# Patient Record
Sex: FEMALE | Race: WHITE | NOT HISPANIC OR LATINO | Employment: FULL TIME | URBAN - METROPOLITAN AREA
[De-identification: names, ages, dates, MRNs, and addresses within clinical notes are randomized per-mention and may not be internally consistent; named-entity substitution may affect disease eponyms.]

---

## 2017-07-12 ENCOUNTER — OFFICE VISIT (OUTPATIENT)
Dept: OBSTETRICS AND GYNECOLOGY | Facility: CLINIC | Age: 27
End: 2017-07-12

## 2017-07-12 VITALS
HEIGHT: 61 IN | WEIGHT: 199 LBS | BODY MASS INDEX: 37.57 KG/M2 | DIASTOLIC BLOOD PRESSURE: 74 MMHG | SYSTOLIC BLOOD PRESSURE: 118 MMHG

## 2017-07-12 DIAGNOSIS — Z01.419 ENCOUNTER FOR GYNECOLOGICAL EXAMINATION WITHOUT ABNORMAL FINDING: Primary | ICD-10-CM

## 2017-07-12 DIAGNOSIS — Z00.00 ANNUAL PHYSICAL EXAM: ICD-10-CM

## 2017-07-12 DIAGNOSIS — Z13.9 SCREENING: ICD-10-CM

## 2017-07-12 LAB
B-HCG UR QL: NEGATIVE
BILIRUB BLD-MCNC: NEGATIVE MG/DL
CLARITY, POC: CLEAR
COLOR UR: YELLOW
GLUCOSE UR STRIP-MCNC: NEGATIVE MG/DL
INTERNAL NEGATIVE CONTROL: NEGATIVE
INTERNAL POSITIVE CONTROL: POSITIVE
KETONES UR QL: NEGATIVE
LEUKOCYTE EST, POC: NEGATIVE
Lab: NORMAL
NITRITE UR-MCNC: NEGATIVE MG/ML
PH UR: 5 [PH] (ref 5–8)
PROT UR STRIP-MCNC: NEGATIVE MG/DL
RBC # UR STRIP: NEGATIVE /UL
SP GR UR: 1.02 (ref 1–1.03)
UROBILINOGEN UR QL: NORMAL

## 2017-07-12 PROCEDURE — 81002 URINALYSIS NONAUTO W/O SCOPE: CPT | Performed by: OBSTETRICS & GYNECOLOGY

## 2017-07-12 PROCEDURE — 99395 PREV VISIT EST AGE 18-39: CPT | Performed by: OBSTETRICS & GYNECOLOGY

## 2017-07-12 PROCEDURE — 81025 URINE PREGNANCY TEST: CPT | Performed by: OBSTETRICS & GYNECOLOGY

## 2017-07-12 RX ORDER — MONTELUKAST SODIUM 10 MG/1
TABLET ORAL
Refills: 4 | COMMUNITY
Start: 2017-06-28 | End: 2018-07-18

## 2017-07-12 RX ORDER — ESCITALOPRAM OXALATE 20 MG/1
TABLET ORAL
Refills: 4 | COMMUNITY
Start: 2017-06-26 | End: 2018-07-18

## 2017-07-12 NOTE — PROGRESS NOTES
GYN Annual Exam     CC- Here for annual exam.     Denise Acosta is a 27 y.o. female who presents for annual well woman exam. Periods are regular every 28-30 days, lasting 5 days. Dysmenorrhea:none. Cyclic symptoms include none. No intermenstrual bleeding, spotting, or discharge.  Still having cycles with Mirena, also has breast tenderness.    OB History      Para Term  AB TAB SAB Ectopic Multiple Living    1                   Current contraception: IUD  History of abnormal Pap smear: no  Family history of uterine, colon or ovarian cancer: no  History of abnormal mammogram: no  Family history of breast cancer: no  Last Pap : 2016    Past Medical History:   Diagnosis Date   • GERD (gastroesophageal reflux disease)    • IBS (irritable bowel syndrome)    • Postural hypotension    • Sepsis 2014       Past Surgical History:   Procedure Laterality Date   • CHOLECYSTECTOMY           Current Outpatient Prescriptions:   •  levonorgestrel (MIRENA) 20 MCG/24HR IUD, 1 each by Intrauterine route 1 (One) Time., Disp: , Rfl:   •  escitalopram (LEXAPRO) 20 MG tablet, , Disp: , Rfl: 4  •  montelukast (SINGULAIR) 10 MG tablet, , Disp: , Rfl: 4    Allergies   Allergen Reactions   • Sulfa Antibiotics Rash       Social History   Substance Use Topics   • Smoking status: Never Smoker   • Smokeless tobacco: None   • Alcohol use No       History reviewed. No pertinent family history.    Review of Systems   Constitutional: Negative for appetite change, fever and unexpected weight change.   Respiratory: Negative for cough and shortness of breath.    Cardiovascular: Negative for chest pain and palpitations.   Gastrointestinal: Negative for abdominal distention, abdominal pain, constipation, diarrhea, nausea and vomiting.   Endocrine: Negative.    Genitourinary: Negative for dyspareunia, menstrual problem, pelvic pain and vaginal discharge.   Skin: Negative.    Hematological: Negative.    Psychiatric/Behavioral: Negative for  "dysphoric mood and sleep disturbance. The patient is not nervous/anxious.        /74  Ht 61\" (154.9 cm)  Wt 199 lb (90.3 kg)  LMP 06/19/2017  Breastfeeding? No  BMI 37.6 kg/m2    Physical Exam   Constitutional: She is oriented to person, place, and time. She appears well-developed and well-nourished.   HENT:   Head: Normocephalic and atraumatic.   Neck: Normal range of motion. Neck supple. No thyromegaly present.   Cardiovascular: Normal rate and regular rhythm.    Pulmonary/Chest: Effort normal and breath sounds normal. Right breast exhibits no mass and no nipple discharge. Left breast exhibits no mass and no nipple discharge. Breasts are symmetrical. There is no breast swelling.   Abdominal: Soft. Bowel sounds are normal. She exhibits no distension and no mass. There is no tenderness. There is no rebound and no guarding.   Genitourinary: Vagina normal and uterus normal. No breast tenderness, discharge or bleeding. Pelvic exam was performed with patient prone. There is no lesion on the right labia. There is no lesion on the left labia. Cervix exhibits no motion tenderness (string seen) and no discharge. Right adnexum displays no mass. Left adnexum displays no mass.   Musculoskeletal: Normal range of motion. She exhibits no edema.   Neurological: She is alert and oriented to person, place, and time.   Skin: Skin is warm and dry.   Psychiatric: She has a normal mood and affect. Her behavior is normal. Judgment and thought content normal.   Nursing note and vitals reviewed.      Diagnoses and all orders for this visit:    Encounter for gynecological examination without abnormal finding    Screening  -     POC Urinalysis Dipstick  -     POC Pregnancy, Urine    Annual physical exam  -     Pap IG, Rfx HPV ASCU    Other orders  -     escitalopram (LEXAPRO) 20 MG tablet;   -     montelukast (SINGULAIR) 10 MG tablet;   -     levonorgestrel (MIRENA) 20 MCG/24HR IUD; 1 each by Intrauterine route 1 (One) " Time.        Assessment     1) GYN annual well woman exam.   2) IUD check     Plan     1) Breast Health - Clinical breast exam & mammogram yearly, Self breast awareness monthly  2) Pap - done today  3) Smoking status- Never smoker  4) Colon health - screening colonoscopy recommended if not up to date  5) Bone health - Weight bearing exercise, dietary calcium recommendations and vitamin D reviewed.   6) Seat belts recommended  7) Follow up prn and one year    Encounter Diagnoses   Name Primary?   • Encounter for gynecological examination without abnormal finding Yes   • Screening    • Annual physical exam          Troy Castro MD  7/12/2017  2:35 PM

## 2017-07-14 LAB
CONV .: NORMAL
CYTOLOGIST CVX/VAG CYTO: NORMAL
CYTOLOGY CVX/VAG DOC THIN PREP: NORMAL
DX ICD CODE: NORMAL
HIV 1 & 2 AB SER-IMP: NORMAL
OTHER STN SPEC: NORMAL
PATH REPORT.FINAL DX SPEC: NORMAL
STAT OF ADQ CVX/VAG CYTO-IMP: NORMAL

## 2017-11-04 ENCOUNTER — HOSPITAL ENCOUNTER (EMERGENCY)
Facility: HOSPITAL | Age: 27
Discharge: HOME | End: 2017-11-05
Attending: EMERGENCY MEDICINE | Admitting: EMERGENCY MEDICINE

## 2017-11-04 DIAGNOSIS — M25.50 ARTHRALGIA, UNSPECIFIED JOINT: Primary | ICD-10-CM

## 2017-11-04 DIAGNOSIS — R11.2 NAUSEA VOMITING AND DIARRHEA: ICD-10-CM

## 2017-11-04 DIAGNOSIS — R19.7 NAUSEA VOMITING AND DIARRHEA: ICD-10-CM

## 2017-11-04 PROCEDURE — 99283 EMERGENCY DEPT VISIT LOW MDM: CPT

## 2017-11-05 VITALS
HEIGHT: 61 IN | RESPIRATION RATE: 16 BRPM | SYSTOLIC BLOOD PRESSURE: 120 MMHG | DIASTOLIC BLOOD PRESSURE: 75 MMHG | OXYGEN SATURATION: 99 % | HEART RATE: 79 BPM | WEIGHT: 190 LBS | TEMPERATURE: 98.3 F | BODY MASS INDEX: 35.87 KG/M2

## 2017-11-05 LAB
ALBUMIN SERPL-MCNC: 4 G/DL (ref 3.5–5.2)
ALBUMIN/GLOB SERPL: 1.2 G/DL
ALP SERPL-CCNC: 83 U/L (ref 40–129)
ALT SERPL W P-5'-P-CCNC: 21 U/L (ref 5–33)
ANION GAP SERPL CALCULATED.3IONS-SCNC: 12.5 MMOL/L
AST SERPL-CCNC: 24 U/L (ref 5–32)
B-HCG UR QL: NEGATIVE
BACTERIA UR QL AUTO: ABNORMAL /HPF
BASOPHILS # BLD AUTO: 0.03 10*3/MM3 (ref 0–0.2)
BASOPHILS NFR BLD AUTO: 0.4 % (ref 0–2)
BILIRUB SERPL-MCNC: 0.3 MG/DL (ref 0.2–1.2)
BILIRUB UR QL STRIP: NEGATIVE
BUN BLD-MCNC: 10 MG/DL (ref 6–20)
BUN/CREAT SERPL: 15.4 (ref 7–25)
CALCIUM SPEC-SCNC: 8.7 MG/DL (ref 8.6–10.5)
CHLORIDE SERPL-SCNC: 99 MMOL/L (ref 98–107)
CLARITY UR: CLEAR
CO2 SERPL-SCNC: 24.5 MMOL/L (ref 22–29)
COLOR UR: YELLOW
CREAT BLD-MCNC: 0.65 MG/DL (ref 0.57–1)
DEPRECATED RDW RBC AUTO: 40.7 FL (ref 37–54)
EOSINOPHIL # BLD AUTO: 0.02 10*3/MM3 (ref 0.1–0.3)
EOSINOPHIL NFR BLD AUTO: 0.2 % (ref 0–4)
ERYTHROCYTE [DISTWIDTH] IN BLOOD BY AUTOMATED COUNT: 13.2 % (ref 11.5–14.5)
GFR SERPL CREATININE-BSD FRML MDRD: 109 ML/MIN/1.73
GLOBULIN UR ELPH-MCNC: 3.4 GM/DL
GLUCOSE BLD-MCNC: 87 MG/DL (ref 65–99)
GLUCOSE UR STRIP-MCNC: NEGATIVE MG/DL
HCT VFR BLD AUTO: 40.5 % (ref 37–47)
HGB BLD-MCNC: 13.7 G/DL (ref 12–16)
HGB UR QL STRIP.AUTO: ABNORMAL
IMM GRANULOCYTES # BLD: 0.02 10*3/MM3 (ref 0–0.03)
IMM GRANULOCYTES NFR BLD: 0.2 % (ref 0–0.5)
KETONES UR QL STRIP: NEGATIVE
LEUKOCYTE ESTERASE UR QL STRIP.AUTO: ABNORMAL
LYMPHOCYTES # BLD AUTO: 2.04 10*3/MM3 (ref 0.6–4.8)
LYMPHOCYTES NFR BLD AUTO: 25.2 % (ref 20–45)
MCH RBC QN AUTO: 28.4 PG (ref 27–31)
MCHC RBC AUTO-ENTMCNC: 33.8 G/DL (ref 31–37)
MCV RBC AUTO: 84 FL (ref 81–99)
MONOCYTES # BLD AUTO: 0.68 10*3/MM3 (ref 0–1)
MONOCYTES NFR BLD AUTO: 8.4 % (ref 3–8)
NEUTROPHILS # BLD AUTO: 5.32 10*3/MM3 (ref 1.5–8.3)
NEUTROPHILS NFR BLD AUTO: 65.6 % (ref 45–70)
NITRITE UR QL STRIP: NEGATIVE
NRBC BLD MANUAL-RTO: 0 /100 WBC (ref 0–0)
PH UR STRIP.AUTO: 6 [PH] (ref 4.5–8)
PLATELET # BLD AUTO: 279 10*3/MM3 (ref 140–500)
PMV BLD AUTO: 10.8 FL (ref 7.4–10.4)
POTASSIUM BLD-SCNC: 3.9 MMOL/L (ref 3.5–5.2)
PROT SERPL-MCNC: 7.4 G/DL (ref 6–8.5)
PROT UR QL STRIP: NEGATIVE
RBC # BLD AUTO: 4.82 10*6/MM3 (ref 4.2–5.4)
RBC # UR: ABNORMAL /HPF
REF LAB TEST METHOD: ABNORMAL
SODIUM BLD-SCNC: 136 MMOL/L (ref 136–145)
SP GR UR STRIP: <=1.005 (ref 1–1.03)
SQUAMOUS #/AREA URNS HPF: ABNORMAL /HPF
UROBILINOGEN UR QL STRIP: ABNORMAL
WBC NRBC COR # BLD: 8.11 10*3/MM3 (ref 4.8–10.8)
WBC UR QL AUTO: ABNORMAL /HPF

## 2017-11-05 PROCEDURE — 99284 EMERGENCY DEPT VISIT MOD MDM: CPT | Performed by: EMERGENCY MEDICINE

## 2017-11-05 PROCEDURE — 80053 COMPREHEN METABOLIC PANEL: CPT | Performed by: EMERGENCY MEDICINE

## 2017-11-05 PROCEDURE — 81025 URINE PREGNANCY TEST: CPT | Performed by: EMERGENCY MEDICINE

## 2017-11-05 PROCEDURE — 81001 URINALYSIS AUTO W/SCOPE: CPT | Performed by: EMERGENCY MEDICINE

## 2017-11-05 PROCEDURE — 87086 URINE CULTURE/COLONY COUNT: CPT | Performed by: EMERGENCY MEDICINE

## 2017-11-05 PROCEDURE — 85025 COMPLETE CBC W/AUTO DIFF WBC: CPT | Performed by: EMERGENCY MEDICINE

## 2017-11-05 PROCEDURE — 96374 THER/PROPH/DIAG INJ IV PUSH: CPT

## 2017-11-05 PROCEDURE — 25010000002 ONDANSETRON PER 1 MG: Performed by: EMERGENCY MEDICINE

## 2017-11-05 RX ORDER — NABUMETONE 750 MG/1
750 TABLET, FILM COATED ORAL 2 TIMES DAILY PRN
Qty: 14 TABLET | Refills: 0 | Status: SHIPPED | OUTPATIENT
Start: 2017-11-05 | End: 2017-11-12

## 2017-11-05 RX ORDER — ONDANSETRON 2 MG/ML
4 INJECTION INTRAMUSCULAR; INTRAVENOUS ONCE
Status: COMPLETED | OUTPATIENT
Start: 2017-11-05 | End: 2017-11-05

## 2017-11-05 RX ORDER — ONDANSETRON 4 MG/1
4 TABLET, ORALLY DISINTEGRATING ORAL EVERY 6 HOURS PRN
Qty: 20 TABLET | Refills: 0 | Status: SHIPPED | OUTPATIENT
Start: 2017-11-05 | End: 2018-07-18

## 2017-11-05 RX ORDER — DIPHENOXYLATE HYDROCHLORIDE AND ATROPINE SULFATE 2.5; .025 MG/1; MG/1
1 TABLET ORAL 4 TIMES DAILY PRN
Qty: 10 TABLET | Refills: 0 | Status: SHIPPED | OUTPATIENT
Start: 2017-11-05 | End: 2018-07-18

## 2017-11-05 RX ADMIN — ONDANSETRON 4 MG: 2 INJECTION, SOLUTION INTRAMUSCULAR; INTRAVENOUS at 01:38

## 2017-11-05 NOTE — DISCHARGE INSTRUCTIONS
Medications directed.  Follow-up with Dr. Easton as above.  Can prescribing a strong NSAID.  Watch for signs of GI bleeding.  Return to ED for medical emergencies.

## 2017-11-05 NOTE — ED PROVIDER NOTES
Subjective   Patient is a 27 y.o. female presenting with vomiting.   History provided by:  Patient  Vomiting   The primary symptoms include nausea, vomiting, diarrhea, myalgias and arthralgias. Primary symptoms do not include fever or rash.   The vomiting began today. Vomiting occurred once.   The diarrhea began today. Daily occurrences: 3 times.   Significant associated medical issues include GERD and irritable bowel syndrome.     HPI Narrative:Ms. Acosta is a 27-year-old white female who presents with a two-week history of body aches and migratory joint pain.  Patient reports initially the symptoms were only occurring at night.  However the past 2 days the symptoms have occurred during the day as well as at night.  Tonight patient had an episode where she felt very hot.  This was followed by nausea and one episode of vomiting.  Patient has had 3 episodes of diarrhea today.  Patient denies any recent illness or injury.  Patient presents for evaluation.        Review of Systems   Constitutional: Negative.  Negative for appetite change, diaphoresis and fever.   HENT: Negative.    Eyes: Negative.    Respiratory: Negative for cough, chest tightness, shortness of breath and wheezing.    Cardiovascular: Negative for chest pain, palpitations and leg swelling.   Gastrointestinal: Positive for diarrhea, nausea and vomiting.   Genitourinary: Negative.  Negative for difficulty urinating, flank pain, frequency and hematuria.   Musculoskeletal: Positive for arthralgias and myalgias.   Skin: Negative.  Negative for color change, pallor and rash.   Neurological: Negative.  Negative for dizziness, seizures, syncope and headaches.   Psychiatric/Behavioral: Negative.  Negative for agitation, behavioral problems and hallucinations.       Past Medical History:   Diagnosis Date   • GERD (gastroesophageal reflux disease)    • IBS (irritable bowel syndrome)    • Postural hypotension    • Sepsis 04/2014       Allergies   Allergen  Reactions   • Sulfa Antibiotics Rash       Past Surgical History:   Procedure Laterality Date   • CHOLECYSTECTOMY         History reviewed. No pertinent family history.    Social History     Social History   • Marital status:      Spouse name: N/A   • Number of children: N/A   • Years of education: N/A     Social History Main Topics   • Smoking status: Never Smoker   • Smokeless tobacco: None   • Alcohol use No   • Drug use: No   • Sexual activity: Not Asked     Other Topics Concern   • None     Social History Narrative           Objective   Physical Exam   Constitutional: She is oriented to person, place, and time. She appears well-developed and well-nourished. No distress.   27-year-old white female laying in bed.  She is a bit overweight but otherwise appears in good health.  She is accompanied by her spouse.   HENT:   Head: Normocephalic and atraumatic.   Right Ear: External ear normal.   Left Ear: External ear normal.   Nose: Nose normal.   Mouth/Throat: Oropharynx is clear and moist.   Eyes: Conjunctivae and EOM are normal. Pupils are equal, round, and reactive to light.   Neck: Normal range of motion. Neck supple.   Cardiovascular: Normal rate, regular rhythm, normal heart sounds and intact distal pulses.  Exam reveals no gallop and no friction rub.    No murmur heard.  Pulmonary/Chest: Effort normal and breath sounds normal.   Abdominal: Soft. Bowel sounds are normal. She exhibits no distension. There is no tenderness.   Musculoskeletal: Normal range of motion.   Neurological: She is alert and oriented to person, place, and time.   Skin: Skin is warm and dry. She is not diaphoretic.   Psychiatric: She has a normal mood and affect. Her behavior is normal.   Nursing note and vitals reviewed.      Procedures         ED Course  ED Course   Comment By Time   11/04/17  11:55 PM  Will obtain baseline labs.  Patient does not request medication for pain, nausea or vomiting or diarrhea at this time. Juan Manuel CHE  MD Dulce Maria 11/04 2356   11/05/17  1:27 AM  CBC and CMP are unremarkable.  Urine is contaminated.  Patient has no symptoms of dysuria.  I find no etiology for her laboratory joint pain.  Will DC home. Juan Manuel العراقي MD 11/05 0128   11/05/17  1:33 AM  Patient is feeling hot and nauseated again.  Will give Zofran. Juan Manuel العراقي MD 11/05 0133   11/05/17  1:56 AM  Patient feeling better.  Will DC home.  Patient mentioned that she has an aunt with lupus.  While patient's symptoms are not consistent with lupus. I reviewed this information has a further indication that patient should follow-up with her PMD for additional testing and evaluation. Juan Manuel العراقي MD 11/05 0157      Labs Reviewed   URINALYSIS W/ CULTURE IF INDICATED - Abnormal; Notable for the following:        Result Value    Blood, UA Small (1+) (*)     Leuk Esterase, UA Small (1+) (*)     All other components within normal limits   CBC WITH AUTO DIFFERENTIAL - Abnormal; Notable for the following:     MPV 10.8 (*)     Monocyte % 8.4 (*)     Eosinophils, Absolute 0.02 (*)     All other components within normal limits   URINALYSIS, MICROSCOPIC ONLY - Abnormal; Notable for the following:     RBC, UA 6-12 (*)     WBC, UA 6-12 (*)     Bacteria, UA Trace (*)     Squamous Epithelial Cells, UA 7-12 (*)     All other components within normal limits   PREGNANCY, URINE - Normal   URINE CULTURE   COMPREHENSIVE METABOLIC PANEL   CBC AND DIFFERENTIAL    Narrative:     The following orders were created for panel order CBC & Differential.  Procedure                               Abnormality         Status                     ---------                               -----------         ------                     CBC Auto Differential[[id number]]        Abnormal            Final result                 Please view results for these tests on the individual orders.               MDM  Number of Diagnoses or Management Options  Arthralgia, unspecified joint: new and requires  workup  Nausea vomiting and diarrhea: new and requires workup     Amount and/or Complexity of Data Reviewed  Clinical lab tests: reviewed and ordered    Risk of Complications, Morbidity, and/or Mortality  Presenting problems: low  Diagnostic procedures: moderate  Management options: low    Patient Progress  Patient progress: stable      Final diagnoses:   Arthralgia, unspecified joint   Nausea vomiting and diarrhea            Juan Manuel العراقي MD  11/05/17 5201

## 2017-11-06 LAB
BACTERIA SPEC AEROBE CULT: NORMAL
BACTERIA SPEC AEROBE CULT: NORMAL

## 2017-11-08 ENCOUNTER — OFFICE VISIT (OUTPATIENT)
Dept: RETAIL CLINIC | Facility: CLINIC | Age: 27
End: 2017-11-08

## 2017-11-08 VITALS
DIASTOLIC BLOOD PRESSURE: 76 MMHG | RESPIRATION RATE: 17 BRPM | OXYGEN SATURATION: 99 % | SYSTOLIC BLOOD PRESSURE: 116 MMHG | TEMPERATURE: 99.6 F | HEART RATE: 103 BPM

## 2017-11-08 DIAGNOSIS — J02.9 SORE THROAT: ICD-10-CM

## 2017-11-08 DIAGNOSIS — H69.83 DYSFUNCTION OF BOTH EUSTACHIAN TUBES: Primary | ICD-10-CM

## 2017-11-08 LAB
EXPIRATION DATE: NORMAL
INTERNAL CONTROL: NORMAL
Lab: NORMAL
S PYO AG THROAT QL: NEGATIVE

## 2017-11-08 PROCEDURE — 87880 STREP A ASSAY W/OPTIC: CPT | Performed by: NURSE PRACTITIONER

## 2017-11-08 PROCEDURE — 99213 OFFICE O/P EST LOW 20 MIN: CPT | Performed by: NURSE PRACTITIONER

## 2017-11-08 NOTE — PROGRESS NOTES
Subjective   Denise Acosta is a 27 y.o. female.     HPI Comments: Pt reports with a sore throat. Reports her daughter has had a sore throat and was seen by her PCP. Reports she was not strep tested. No fever.     Sore Throat    This is a new problem. The current episode started yesterday. The problem has been unchanged. There has been no fever. Associated symptoms include ear pain, headaches and vomiting. Pertinent negatives include no ear discharge. She has had no exposure to strep. She has tried nothing for the symptoms. The treatment provided no relief.        The following portions of the patient's history were reviewed and updated as appropriate: allergies, current medications, past family history, past medical history, past social history, past surgical history and problem list.    Review of Systems   Constitutional: Negative.  Negative for chills and fever.   HENT: Positive for ear pain and sore throat. Negative for ear discharge.         Ears popping   Eyes: Negative.    Respiratory: Negative.    Cardiovascular: Negative.    Gastrointestinal: Positive for vomiting.        Diarrhea and vomited x 2 last week   Endocrine: Negative.    Genitourinary: Negative.    Musculoskeletal: Negative.    Skin: Negative.    Allergic/Immunologic: Negative.    Neurological: Positive for headaches.   Hematological: Negative.    Psychiatric/Behavioral: Negative.        Objective   Physical Exam   Constitutional: She is oriented to person, place, and time. Vital signs are normal. She appears well-developed and well-nourished.   HENT:   Head: Normocephalic and atraumatic.   Right Ear: Hearing, tympanic membrane, external ear and ear canal normal.   Left Ear: Hearing, tympanic membrane, external ear and ear canal normal.   Nose: Nose normal. Right sinus exhibits no maxillary sinus tenderness and no frontal sinus tenderness. Left sinus exhibits no maxillary sinus tenderness and no frontal sinus tenderness.   Mouth/Throat: Uvula  is midline and mucous membranes are normal. Posterior oropharyngeal erythema present. No tonsillar exudate.   Eyes: Conjunctivae and lids are normal. Pupils are equal, round, and reactive to light.   Neck: Trachea normal and normal range of motion. Neck supple.   Cardiovascular: Normal rate, regular rhythm, S1 normal, S2 normal and normal heart sounds.    Pulmonary/Chest: Effort normal and breath sounds normal.   Abdominal: Soft. Normal appearance and bowel sounds are normal. There is no tenderness.   Musculoskeletal: Normal range of motion.   Lymphadenopathy:     She has no cervical adenopathy.   Neurological: She is alert and oriented to person, place, and time. She has normal strength.   Skin: Skin is warm, dry and intact. No rash noted.   Psychiatric: She has a normal mood and affect. Her speech is normal and behavior is normal.   Vitals reviewed.      Assessment/Plan   Problems Addressed this Visit     None      Visit Diagnoses     Dysfunction of both eustachian tubes    -  Primary    Sore throat        Relevant Orders    POC Rapid Strep A    Beta Strep Culture, Throat - Swab, Throat        flonase 2 sprays qd

## 2017-11-10 LAB — S PYO THROAT QL CULT: NEGATIVE

## 2017-11-11 ENCOUNTER — TELEPHONE (OUTPATIENT)
Dept: RETAIL CLINIC | Facility: CLINIC | Age: 27
End: 2017-11-11

## 2017-11-12 ENCOUNTER — TELEPHONE (OUTPATIENT)
Dept: RETAIL CLINIC | Facility: CLINIC | Age: 27
End: 2017-11-12

## 2018-07-18 ENCOUNTER — OFFICE VISIT (OUTPATIENT)
Dept: OBSTETRICS AND GYNECOLOGY | Facility: CLINIC | Age: 28
End: 2018-07-18

## 2018-07-18 VITALS
DIASTOLIC BLOOD PRESSURE: 80 MMHG | WEIGHT: 204 LBS | BODY MASS INDEX: 38.51 KG/M2 | HEIGHT: 61 IN | SYSTOLIC BLOOD PRESSURE: 120 MMHG

## 2018-07-18 DIAGNOSIS — N93.8 DUB (DYSFUNCTIONAL UTERINE BLEEDING): Primary | ICD-10-CM

## 2018-07-18 DIAGNOSIS — Z30.432 ENCOUNTER FOR REMOVAL OF INTRAUTERINE CONTRACEPTIVE DEVICE (IUD): ICD-10-CM

## 2018-07-18 LAB
B-HCG UR QL: NEGATIVE
BILIRUB BLD-MCNC: NEGATIVE MG/DL
CLARITY, POC: CLEAR
COLOR UR: YELLOW
GLUCOSE UR STRIP-MCNC: NEGATIVE MG/DL
INTERNAL NEGATIVE CONTROL: NEGATIVE
INTERNAL POSITIVE CONTROL: POSITIVE
KETONES UR QL: NEGATIVE
LEUKOCYTE EST, POC: NEGATIVE
Lab: NORMAL
NITRITE UR-MCNC: NEGATIVE MG/ML
PH UR: 6 [PH] (ref 5–8)
PROT UR STRIP-MCNC: NEGATIVE MG/DL
RBC # UR STRIP: NEGATIVE /UL
SP GR UR: 1.02 (ref 1–1.03)
UROBILINOGEN UR QL: NORMAL

## 2018-07-18 PROCEDURE — 81002 URINALYSIS NONAUTO W/O SCOPE: CPT | Performed by: OBSTETRICS & GYNECOLOGY

## 2018-07-18 PROCEDURE — 81025 URINE PREGNANCY TEST: CPT | Performed by: OBSTETRICS & GYNECOLOGY

## 2018-07-18 PROCEDURE — 58301 REMOVE INTRAUTERINE DEVICE: CPT | Performed by: OBSTETRICS & GYNECOLOGY

## 2018-07-18 RX ORDER — FLUTICASONE PROPIONATE 50 MCG
2 SPRAY, SUSPENSION (ML) NASAL DAILY
COMMUNITY
End: 2019-08-28

## 2018-07-18 RX ORDER — CITALOPRAM 40 MG/1
40 TABLET ORAL DAILY
COMMUNITY
End: 2019-08-28

## 2018-07-18 RX ORDER — ATORVASTATIN CALCIUM 10 MG/1
10 TABLET, FILM COATED ORAL DAILY
COMMUNITY
End: 2019-08-28

## 2018-07-18 NOTE — PROGRESS NOTES
This is a 28-year-old  who has had a Mirena for the last 2 years.  She's had dysfunctional uterine bleeding for the last 10 months and would rather not use anything and have regular cycles then continue with the Mirena.  She declines any birth control at this time because of side effects from everything she is tried in the past.  She is okay with a future pregnancy.      IUD Removal Procedure Note    Type of IUD:  Mirena  Date of insertion:  2 years ago  Reason for removal:  Side effect: DUB  Other relevant history/information:  none    Procedure Time Out Documentation      Procedure Details  IUD strings visible:  no  Local anesthesia:  None  Tenaculum used:  None  Removal:  An alligator forceps was entered through the cervical os after the cervix and vagina were prepped.  The IUD was grasped and removed intact.  1 attempt(s) were needed for successful removal.  The patient tolerated the procedure well.    All appropriate instructions regarding removal were reviewed.    Tolerated well  No apparent complications  Post procedure diagnosis : IUD removal     Plans for contraception:  no method    Other follow-up needed:  none    The patient was advised to call for any fever or for prolonged or severe pain or bleeding. She was advised to use NSAID as needed for mild to moderate pain.   Patient tolerated the procedure well.    2018  3:06 PM

## 2019-03-13 ENCOUNTER — TRANSCRIBE ORDERS (OUTPATIENT)
Dept: ADMINISTRATIVE | Facility: HOSPITAL | Age: 29
End: 2019-03-13

## 2019-03-13 DIAGNOSIS — N92.0 MENORRHAGIA WITH REGULAR CYCLE: Primary | ICD-10-CM

## 2019-03-19 ENCOUNTER — HOSPITAL ENCOUNTER (OUTPATIENT)
Dept: ULTRASOUND IMAGING | Facility: HOSPITAL | Age: 29
Discharge: HOME | End: 2019-03-19
Admitting: PHYSICIAN ASSISTANT

## 2019-03-19 DIAGNOSIS — N92.0 MENORRHAGIA WITH REGULAR CYCLE: ICD-10-CM

## 2019-03-19 PROCEDURE — 76830 TRANSVAGINAL US NON-OB: CPT

## 2019-03-19 PROCEDURE — 76856 US EXAM PELVIC COMPLETE: CPT

## 2019-03-22 ENCOUNTER — PATIENT MESSAGE (OUTPATIENT)
Dept: OBSTETRICS AND GYNECOLOGY | Facility: CLINIC | Age: 29
End: 2019-03-22

## 2019-08-28 ENCOUNTER — OFFICE VISIT (OUTPATIENT)
Dept: OBSTETRICS AND GYNECOLOGY | Facility: CLINIC | Age: 29
End: 2019-08-28

## 2019-08-28 VITALS
DIASTOLIC BLOOD PRESSURE: 76 MMHG | BODY MASS INDEX: 38.14 KG/M2 | HEIGHT: 61 IN | WEIGHT: 202 LBS | SYSTOLIC BLOOD PRESSURE: 122 MMHG

## 2019-08-28 DIAGNOSIS — Z13.9 SCREENING FOR CONDITION: ICD-10-CM

## 2019-08-28 DIAGNOSIS — Z11.51 SPECIAL SCREENING EXAMINATION FOR HUMAN PAPILLOMAVIRUS (HPV): ICD-10-CM

## 2019-08-28 DIAGNOSIS — Z01.419 PAP SMEAR, LOW-RISK: ICD-10-CM

## 2019-08-28 DIAGNOSIS — Z01.419 ENCOUNTER FOR GYNECOLOGICAL EXAMINATION WITHOUT ABNORMAL FINDING: Primary | ICD-10-CM

## 2019-08-28 PROCEDURE — 81025 URINE PREGNANCY TEST: CPT | Performed by: OBSTETRICS & GYNECOLOGY

## 2019-08-28 PROCEDURE — 81002 URINALYSIS NONAUTO W/O SCOPE: CPT | Performed by: OBSTETRICS & GYNECOLOGY

## 2019-08-28 PROCEDURE — 99395 PREV VISIT EST AGE 18-39: CPT | Performed by: OBSTETRICS & GYNECOLOGY

## 2019-08-28 RX ORDER — HYOSCYAMINE SULFATE 0.12 MG/5ML
125 LIQUID ORAL EVERY 4 HOURS PRN
COMMUNITY
End: 2021-06-01

## 2019-08-28 RX ORDER — ATORVASTATIN CALCIUM 80 MG/1
TABLET, FILM COATED ORAL
COMMUNITY
Start: 2019-02-08 | End: 2023-01-10 | Stop reason: SDUPTHER

## 2019-08-28 NOTE — PROGRESS NOTES
GYN Annual Exam     CC- Here for annual exam.     Denise Acosta is a 29 y.o. female who presents for annual well woman exam. Periods are irregular, lasting 6 days. Dysmenorrhea:none. Cyclic symptoms include none. No intermenstrual bleeding, spotting, or discharge.    OB History      Para Term  AB Living    1              SAB TAB Ectopic Molar Multiple Live Births                         Current contraception: none  History of abnormal Pap smear: no  Family history of uterine, colon or ovarian cancer: no  History of abnormal mammogram: no  Family history of breast cancer: no  Last Pap : 2016    Past Medical History:   Diagnosis Date   • GERD (gastroesophageal reflux disease)    • IBS (irritable bowel syndrome)    • Postural hypotension    • Sepsis (CMS/HCC) 2014       Past Surgical History:   Procedure Laterality Date   • CHOLECYSTECTOMY     • WISDOM TOOTH EXTRACTION           Current Outpatient Medications:   •  atorvastatin (LIPITOR) 80 MG tablet, , Disp: , Rfl:   •  hyoscyamine (LEVSIN) 0.125 MG/5ML elixir, Take 125 mcg by mouth Every 4 (Four) Hours As Needed for bladder spasms., Disp: , Rfl:     Allergies   Allergen Reactions   • Sulfa Antibiotics Rash       Social History     Tobacco Use   • Smoking status: Never Smoker   • Smokeless tobacco: Never Used   Substance Use Topics   • Alcohol use: No   • Drug use: No       Family History   Problem Relation Age of Onset   • Multiple myeloma Maternal Grandmother    • Heart disease Maternal Grandfather    • Diabetes Paternal Grandfather        Review of Systems   Constitutional: Negative for appetite change, fever and unexpected weight change.   HENT: Negative for congestion and sore throat.    Respiratory: Negative for cough and shortness of breath.    Cardiovascular: Negative for chest pain and palpitations.   Gastrointestinal: Negative for abdominal distention, abdominal pain, constipation, diarrhea, nausea and vomiting.   Endocrine: Negative.   "  Genitourinary: Positive for menstrual problem (irregular cycle). Negative for dyspareunia, pelvic pain and vaginal discharge.   Skin: Negative.    Neurological: Negative for dizziness and syncope.   Hematological: Negative.    Psychiatric/Behavioral: Negative for dysphoric mood and sleep disturbance. The patient is not nervous/anxious.        /76   Ht 154.9 cm (61\")   Wt 91.6 kg (202 lb)   LMP 08/14/2019   BMI 38.17 kg/m²     Physical Exam   Constitutional: She is oriented to person, place, and time. She appears well-developed and well-nourished.   HENT:   Head: Normocephalic and atraumatic.   Neck: Normal range of motion. Neck supple. No thyromegaly present.   Cardiovascular: Normal rate and regular rhythm.   Pulmonary/Chest: Effort normal and breath sounds normal. Right breast exhibits no mass and no nipple discharge. Left breast exhibits no mass and no nipple discharge. Breasts are symmetrical. There is no breast swelling.   Abdominal: Soft. Bowel sounds are normal. She exhibits no distension and no mass. There is no tenderness. There is no rebound and no guarding.   Genitourinary: Vagina normal and uterus normal. No breast tenderness, discharge or bleeding. Pelvic exam was performed with patient prone. There is no lesion on the right labia. There is no lesion on the left labia. Cervix exhibits no motion tenderness and no discharge. Right adnexum displays no mass. Left adnexum displays no mass.   Musculoskeletal: Normal range of motion. She exhibits no edema.   Neurological: She is alert and oriented to person, place, and time.   Skin: Skin is warm and dry.   Psychiatric: She has a normal mood and affect. Her behavior is normal. Judgment and thought content normal.   Nursing note and vitals reviewed.      Diagnoses and all orders for this visit:    Encounter for gynecological examination without abnormal finding    Screening for condition  -     POC Urinalysis Dipstick  -     POC Pregnancy, " Urine    Special screening examination for human papillomavirus (HPV)  -     Pap IG, HPV-hr    Pap smear, low-risk  -     Pap IG, HPV-hr    Other orders  -     atorvastatin (LIPITOR) 80 MG tablet  -     hyoscyamine (LEVSIN) 0.125 MG/5ML elixir; Take 125 mcg by mouth Every 4 (Four) Hours As Needed for bladder spasms.        Assessment     1) GYN annual well woman exam.   2) Irregular cycle -declines birth control right now.  Does desire pregnancy.  Is worried about history of  birth.  Discussed options of Guy in the future.     Plan     1) Breast Health - Clinical breast exam & mammogram yearly, Self breast awareness monthly  2) Pap - done today  3) Smoking status- Never smoker  4) Colon health - screening colonoscopy recommended if not up to date  5) Bone health - Weight bearing exercise, dietary calcium recommendations and vitamin D reviewed.   6) Seat belts recommended  7) Follow up prn and one year    Encounter Diagnoses   Name Primary?   • Encounter for gynecological examination without abnormal finding Yes   • Screening for condition    • Special screening examination for human papillomavirus (HPV)    • Pap smear, low-risk          Troy Castro MD  2019  2:53 PM

## 2019-08-30 LAB
CYTOLOGIST CVX/VAG CYTO: NORMAL
CYTOLOGY CVX/VAG DOC CYTO: NORMAL
CYTOLOGY CVX/VAG DOC THIN PREP: NORMAL
DX ICD CODE: NORMAL
HIV 1 & 2 AB SER-IMP: NORMAL
HPV I/H RISK 1 DNA CVX QL PROBE+SIG AMP: NORMAL
OTHER STN SPEC: NORMAL
REQUEST PROBLEM: NORMAL
STAT OF ADQ CVX/VAG CYTO-IMP: NORMAL

## 2020-04-13 ENCOUNTER — TELEPHONE (OUTPATIENT)
Dept: OBSTETRICS AND GYNECOLOGY | Facility: CLINIC | Age: 30
End: 2020-04-13

## 2020-07-15 ENCOUNTER — OFFICE VISIT (OUTPATIENT)
Dept: OBSTETRICS AND GYNECOLOGY | Facility: CLINIC | Age: 30
End: 2020-07-15

## 2020-07-15 VITALS
WEIGHT: 197 LBS | DIASTOLIC BLOOD PRESSURE: 78 MMHG | SYSTOLIC BLOOD PRESSURE: 122 MMHG | HEIGHT: 61 IN | BODY MASS INDEX: 37.19 KG/M2

## 2020-07-15 DIAGNOSIS — R89.8 ABNORMAL GENETIC TEST: ICD-10-CM

## 2020-07-15 DIAGNOSIS — Z13.9 SCREENING FOR CONDITION: ICD-10-CM

## 2020-07-15 DIAGNOSIS — R10.2 PELVIC PAIN: Primary | ICD-10-CM

## 2020-07-15 LAB
B-HCG UR QL: NEGATIVE
BILIRUB BLD-MCNC: NEGATIVE MG/DL
CLARITY, POC: CLEAR
COLOR UR: YELLOW
GLUCOSE UR STRIP-MCNC: NEGATIVE MG/DL
INTERNAL NEGATIVE CONTROL: NEGATIVE
INTERNAL POSITIVE CONTROL: POSITIVE
KETONES UR QL: NEGATIVE
LEUKOCYTE EST, POC: NEGATIVE
Lab: NORMAL
NITRITE UR-MCNC: NEGATIVE MG/ML
PH UR: 5 [PH] (ref 5–8)
PROT UR STRIP-MCNC: NEGATIVE MG/DL
RBC # UR STRIP: NEGATIVE /UL
SP GR UR: 1.03 (ref 1–1.03)
UROBILINOGEN UR QL: NORMAL

## 2020-07-15 PROCEDURE — 81025 URINE PREGNANCY TEST: CPT | Performed by: OBSTETRICS & GYNECOLOGY

## 2020-07-15 PROCEDURE — 99214 OFFICE O/P EST MOD 30 MIN: CPT | Performed by: OBSTETRICS & GYNECOLOGY

## 2020-07-15 PROCEDURE — 81002 URINALYSIS NONAUTO W/O SCOPE: CPT | Performed by: OBSTETRICS & GYNECOLOGY

## 2020-07-15 RX ORDER — BUPROPION HYDROCHLORIDE 150 MG/1
TABLET, EXTENDED RELEASE ORAL
COMMUNITY
Start: 2020-06-11 | End: 2021-06-01

## 2020-07-15 NOTE — PROGRESS NOTES
"      Denise Acosta is a 30 y.o. patient who presents for follow up of   Chief Complaint   Patient presents with   • Pelvic Pain       HPI 30-year-old -1-0-1 with pelvic pain since 2020.  She developed the pain is her primary care physician but then COVID-19 hit and she has not followed up since that time.  She said the pain occurs daily and it is sharp.  Is mainly in the midline and does not radiate.  She denies any vaginal discharge, fever.  Manito is painful.  She has no family history of endometriosis.  She was notified from a commercial genetic lab that she has an increased risk of ovarian cancer and sent me a copy of the scanned forms in epic.  She is using vasectomy for birth control.  This is a new problem that is worsening.    The following portions of the patient's history were reviewed and updated as appropriate: allergies, current medications and problem list.    Review of Systems   Constitutional: Negative for appetite change, fever and unexpected weight change.   HENT: Negative for congestion and sore throat.    Respiratory: Negative for cough and shortness of breath.    Cardiovascular: Negative for chest pain and palpitations.   Gastrointestinal: Negative for abdominal distention, abdominal pain, constipation, diarrhea, nausea and vomiting.   Endocrine: Negative.    Genitourinary: Positive for dyspareunia and pelvic pain. Negative for menstrual problem and vaginal discharge.   Skin: Negative.    Neurological: Negative for dizziness and syncope.   Hematological: Negative.    Psychiatric/Behavioral: Negative for dysphoric mood and sleep disturbance. The patient is not nervous/anxious.        /78   Ht 154.9 cm (61\")   Wt 89.4 kg (197 lb)   LMP 2020   BMI 37.22 kg/m²     Physical Exam   Constitutional: She is oriented to person, place, and time. She appears well-developed and well-nourished.   HENT:   Head: Normocephalic and atraumatic.   Pulmonary/Chest: Effort normal. " No respiratory distress.   Abdominal: Soft. She exhibits no distension and no mass. There is no tenderness. There is no rebound and no guarding.   Musculoskeletal: Normal range of motion.   Neurological: She is alert and oriented to person, place, and time.   Skin: Skin is warm and dry.   Psychiatric: She has a normal mood and affect. Her behavior is normal. Judgment and thought content normal.   Nursing note and vitals reviewed.  I reviewed the scanned images of her genetic report in epic.  There appears to be a significant increased risk for ovarian cancer that might justify prophylactic BSO in the future.      Assessment/Plan    Denise was seen today for pelvic pain.    Diagnoses and all orders for this visit:    Pelvic pain    Screening for condition  -     POC Urinalysis Dipstick  -     POC Pregnancy, Urine    This is a new problem that is worsening with further work-up planned.  Ultrasound of the pelvis has been ordered.  I would like for the patient to meet with a genetic counselor.  I suspect we may need to do a diagnostic laparoscopy to rule out endometriosis.  She may need a prophylactic BSO at the same time.     Return for US, TV, Follow up with me.      Troy Castro MD  7/15/2020  13:23

## 2020-08-25 ENCOUNTER — PROCEDURE VISIT (OUTPATIENT)
Dept: OBSTETRICS AND GYNECOLOGY | Facility: CLINIC | Age: 30
End: 2020-08-25

## 2020-08-25 ENCOUNTER — OFFICE VISIT (OUTPATIENT)
Dept: OBSTETRICS AND GYNECOLOGY | Facility: CLINIC | Age: 30
End: 2020-08-25

## 2020-08-25 VITALS
BODY MASS INDEX: 37.57 KG/M2 | SYSTOLIC BLOOD PRESSURE: 110 MMHG | WEIGHT: 199 LBS | DIASTOLIC BLOOD PRESSURE: 70 MMHG | HEIGHT: 61 IN

## 2020-08-25 DIAGNOSIS — R10.2 PELVIC PAIN: Primary | ICD-10-CM

## 2020-08-25 PROCEDURE — 99213 OFFICE O/P EST LOW 20 MIN: CPT | Performed by: OBSTETRICS & GYNECOLOGY

## 2020-08-25 PROCEDURE — 76830 TRANSVAGINAL US NON-OB: CPT | Performed by: OBSTETRICS & GYNECOLOGY

## 2020-08-25 NOTE — PROGRESS NOTES
"      Denise Acosta is a 30 y.o. patient who presents for follow up of   Chief Complaint   Patient presents with   • Follow-up       HPI patient here for follow-up.  She has had intermittent pelvic pain that tends to radiate to her back.  It is been occurring more frequently over the last 6 months.  She presents today for an ultrasound.  She is using vasectomy for birth control.  Her cycles are regular.    The following portions of the patient's history were reviewed and updated as appropriate: allergies, current medications and problem list.    Review of Systems   Constitutional: Negative for appetite change, fever and unexpected weight change.   HENT: Negative for congestion and sore throat.    Respiratory: Negative for cough and shortness of breath.    Cardiovascular: Negative for chest pain and palpitations.   Gastrointestinal: Negative for abdominal distention, abdominal pain, constipation, diarrhea, nausea and vomiting.   Endocrine: Negative.    Genitourinary: Positive for pelvic pain. Negative for dyspareunia, menstrual problem and vaginal discharge.   Skin: Negative.    Neurological: Negative for dizziness and syncope.   Hematological: Negative.    Psychiatric/Behavioral: Negative for dysphoric mood and sleep disturbance. The patient is not nervous/anxious.        /70   Ht 154.9 cm (61\")   Wt 90.3 kg (199 lb)   Breastfeeding No   BMI 37.60 kg/m²     Physical Exam   Constitutional: She is oriented to person, place, and time. She appears well-developed and well-nourished.   HENT:   Head: Normocephalic and atraumatic.   Pulmonary/Chest: Effort normal. No respiratory distress.   Abdominal: Soft. She exhibits no distension and no mass. There is no tenderness. There is no rebound and no guarding.   Musculoskeletal: Normal range of motion.   Neurological: She is alert and oriented to person, place, and time.   Skin: Skin is warm and dry.   Psychiatric: She has a normal mood and affect. Her behavior is " normal. Judgment and thought content normal.   Nursing note and vitals reviewed.  Ultrasound shows an anteverted uterus.  Normal size shape and contour.  Endometrial thickness was normal.  Both ovaries appeared normal.  There is a small amount of free fluid in the right adnexa.      Assessment/Plan    Denise was seen today for follow-up.    Diagnoses and all orders for this visit:    Pelvic pain    Likely ovulatory pain.  No pathology that needs an operation at this time.  Patient was reassured.  She has annual physical next month.    Return if symptoms worsen or fail to improve.      Troy Castro MD  8/25/2020  15:43

## 2020-09-01 ENCOUNTER — OFFICE VISIT (OUTPATIENT)
Dept: OBSTETRICS AND GYNECOLOGY | Facility: CLINIC | Age: 30
End: 2020-09-01

## 2020-09-01 VITALS
WEIGHT: 198 LBS | DIASTOLIC BLOOD PRESSURE: 82 MMHG | HEIGHT: 61 IN | SYSTOLIC BLOOD PRESSURE: 122 MMHG | BODY MASS INDEX: 37.38 KG/M2

## 2020-09-01 DIAGNOSIS — Z01.419 ROUTINE GYNECOLOGICAL EXAMINATION: Primary | ICD-10-CM

## 2020-09-01 DIAGNOSIS — Z11.51 SPECIAL SCREENING EXAMINATION FOR HUMAN PAPILLOMAVIRUS (HPV): ICD-10-CM

## 2020-09-01 DIAGNOSIS — Z01.419 PAP SMEAR, LOW-RISK: ICD-10-CM

## 2020-09-01 LAB
B-HCG UR QL: NEGATIVE
BILIRUB BLD-MCNC: NEGATIVE MG/DL
CLARITY, POC: CLEAR
COLOR UR: YELLOW
GLUCOSE UR STRIP-MCNC: NEGATIVE MG/DL
INTERNAL NEGATIVE CONTROL: NEGATIVE
INTERNAL POSITIVE CONTROL: POSITIVE
KETONES UR QL: NEGATIVE
LEUKOCYTE EST, POC: NEGATIVE
Lab: NORMAL
NITRITE UR-MCNC: NEGATIVE MG/ML
PH UR: 5 [PH] (ref 5–8)
PROT UR STRIP-MCNC: NEGATIVE MG/DL
RBC # UR STRIP: NEGATIVE /UL
SP GR UR: 1 (ref 1–1.03)
UROBILINOGEN UR QL: NORMAL

## 2020-09-01 PROCEDURE — 81002 URINALYSIS NONAUTO W/O SCOPE: CPT | Performed by: OBSTETRICS & GYNECOLOGY

## 2020-09-01 PROCEDURE — 99395 PREV VISIT EST AGE 18-39: CPT | Performed by: OBSTETRICS & GYNECOLOGY

## 2020-09-01 PROCEDURE — 81025 URINE PREGNANCY TEST: CPT | Performed by: OBSTETRICS & GYNECOLOGY

## 2020-09-01 NOTE — PROGRESS NOTES
GYN Annual Exam     CC- Here for annual exam.     Denise Acosta is a 30 y.o. female who presents for annual well woman exam. Periods are regular every 28-30 days, lasting 5 days. Dysmenorrhea:none. Cyclic symptoms include none. No intermenstrual bleeding, spotting, or discharge.    OB History        1    Para   1    Term           1    AB        Living   1       SAB        TAB        Ectopic        Molar        Multiple        Live Births   1                Current contraception: vasectomy  History of abnormal Pap smear: no  Family history of uterine, colon or ovarian cancer: no  History of abnormal mammogram: no  Family history of breast cancer: yes - aunt  Last Pap :  normal but not enough cells for HPV    Past Medical History:   Diagnosis Date   • GERD (gastroesophageal reflux disease)    • IBS (irritable bowel syndrome)    • Postural hypotension    • Sepsis (CMS/HCC) 2014       Past Surgical History:   Procedure Laterality Date   • CHOLECYSTECTOMY     • WISDOM TOOTH EXTRACTION           Current Outpatient Medications:   •  atorvastatin (LIPITOR) 80 MG tablet, , Disp: , Rfl:   •  buPROPion SR (WELLBUTRIN SR) 150 MG 12 hr tablet, , Disp: , Rfl:   •  hyoscyamine (LEVSIN) 0.125 MG/5ML elixir, Take 125 mcg by mouth Every 4 (Four) Hours As Needed for bladder spasms., Disp: , Rfl:     Allergies   Allergen Reactions   • Sulfa Antibiotics Rash   • Sulfasalazine Rash   • Hydrocodone-Acetaminophen Nausea And Vomiting       Social History     Tobacco Use   • Smoking status: Never Smoker   • Smokeless tobacco: Never Used   Substance Use Topics   • Alcohol use: No   • Drug use: No         Family History   Problem Relation Age of Onset   • Multiple myeloma Maternal Grandmother    • Heart disease Maternal Grandfather    • Diabetes Paternal Grandfather        Review of Systems   Constitutional: Negative for appetite change, fever and unexpected weight change.   HENT: Negative for congestion and sore  "throat.    Respiratory: Negative for cough and shortness of breath.    Cardiovascular: Negative for chest pain and palpitations.   Gastrointestinal: Negative for abdominal distention, abdominal pain, constipation, diarrhea, nausea and vomiting.   Endocrine: Negative.    Genitourinary: Negative for dyspareunia, menstrual problem, pelvic pain and vaginal discharge.   Skin: Negative.    Neurological: Negative for dizziness and syncope.   Hematological: Negative.    Psychiatric/Behavioral: Negative for dysphoric mood and sleep disturbance. The patient is not nervous/anxious.        /82   Ht 154.9 cm (61\")   Wt 89.8 kg (198 lb)   LMP 08/24/2020   Breastfeeding No   BMI 37.41 kg/m²     Physical Exam   Constitutional: She is oriented to person, place, and time. She appears well-developed and well-nourished.   HENT:   Head: Normocephalic and atraumatic.   Neck: Normal range of motion. Neck supple. No thyromegaly present.   Cardiovascular: Normal rate and regular rhythm.   Pulmonary/Chest: Effort normal and breath sounds normal. Right breast exhibits no mass and no nipple discharge. Left breast exhibits no mass and no nipple discharge. No breast swelling, tenderness, discharge or bleeding. Breasts are symmetrical.   Abdominal: Soft. Bowel sounds are normal. She exhibits no distension and no mass. There is no tenderness. There is no rebound and no guarding.   Genitourinary: Vagina normal and uterus normal. No breast swelling, tenderness, discharge or bleeding. Pelvic exam was performed with patient prone. There is no lesion on the right labia. There is no lesion on the left labia. Cervix exhibits no motion tenderness and no discharge. Right adnexum displays no mass. Left adnexum displays no mass.   Musculoskeletal: Normal range of motion. She exhibits no edema.   Neurological: She is alert and oriented to person, place, and time.   Skin: Skin is warm and dry.   Psychiatric: She has a normal mood and affect. Her " behavior is normal. Judgment and thought content normal.   Nursing note and vitals reviewed.      Diagnoses and all orders for this visit:    Pap smear, low-risk  -     POC Urinalysis Dipstick  -     POC Pregnancy, Urine  -     Pap IG, HPV-hr    Routine gynecological examination  -     POC Urinalysis Dipstick  -     POC Pregnancy, Urine  -     Pap IG, HPV-hr    Special screening examination for human papillomavirus (HPV)  -     POC Urinalysis Dipstick  -     POC Pregnancy, Urine  -     Pap IG, HPV-hr        Assessment     1) GYN annual well woman exam.   2) vasectomy for birth control     Plan     1) Breast Health - Clinical breast exam & mammogram yearly, Self breast awareness monthly  2) Pap - done today  3) Smoking status- Denise Acosta  reports that she has never smoked. She has never used smokeless tobacco.. I have educated her on the risk of diseases from using tobacco products such as cancer, COPD and heart diease.   4) Colon health - screening colonoscopy recommended if not up to date  5) Bone health - Weight bearing exercise, dietary calcium recommendations and vitamin D reviewed.   6) Seat belts recommended  7) Follow up prn and one year    Encounter Diagnoses   Name Primary?   • Pap smear, low-risk Yes   • Routine gynecological examination    • Special screening examination for human papillomavirus (HPV)          Troy Castro MD  9/1/2020  15:56

## 2020-09-03 LAB
CYTOLOGIST CVX/VAG CYTO: NORMAL
CYTOLOGY CVX/VAG DOC CYTO: NORMAL
CYTOLOGY CVX/VAG DOC THIN PREP: NORMAL
DX ICD CODE: NORMAL
HIV 1 & 2 AB SER-IMP: NORMAL
HPV I/H RISK 1 DNA CVX QL PROBE+SIG AMP: NEGATIVE
OTHER STN SPEC: NORMAL
STAT OF ADQ CVX/VAG CYTO-IMP: NORMAL

## 2021-06-01 ENCOUNTER — HOSPITAL ENCOUNTER (EMERGENCY)
Facility: HOSPITAL | Age: 31
Discharge: HOME | End: 2021-06-01
Attending: EMERGENCY MEDICINE | Admitting: EMERGENCY MEDICINE

## 2021-06-01 ENCOUNTER — APPOINTMENT (OUTPATIENT)
Dept: CT IMAGING | Facility: HOSPITAL | Age: 31
End: 2021-06-01

## 2021-06-01 VITALS
HEART RATE: 82 BPM | DIASTOLIC BLOOD PRESSURE: 58 MMHG | RESPIRATION RATE: 15 BRPM | BODY MASS INDEX: 38.68 KG/M2 | SYSTOLIC BLOOD PRESSURE: 100 MMHG | WEIGHT: 197 LBS | OXYGEN SATURATION: 96 % | HEIGHT: 60 IN | TEMPERATURE: 98.6 F

## 2021-06-01 DIAGNOSIS — N20.0 KIDNEY STONE: Primary | ICD-10-CM

## 2021-06-01 LAB
ALBUMIN SERPL-MCNC: 4.1 G/DL (ref 3.5–5.2)
ALBUMIN/GLOB SERPL: 1.4 G/DL
ALP SERPL-CCNC: 102 U/L (ref 39–117)
ALT SERPL W P-5'-P-CCNC: 20 U/L (ref 1–33)
AMORPH URATE CRY URNS QL MICRO: ABNORMAL /HPF
ANION GAP SERPL CALCULATED.3IONS-SCNC: 10.8 MMOL/L (ref 5–15)
AST SERPL-CCNC: 18 U/L (ref 1–32)
B-HCG UR QL: NEGATIVE
BACTERIA UR QL AUTO: ABNORMAL /HPF
BASOPHILS # BLD AUTO: 0.03 10*3/MM3 (ref 0–0.2)
BASOPHILS NFR BLD AUTO: 0.3 % (ref 0–1.5)
BILIRUB SERPL-MCNC: 0.3 MG/DL (ref 0–1.2)
BILIRUB UR QL STRIP: NEGATIVE
BUN SERPL-MCNC: 13 MG/DL (ref 6–20)
BUN/CREAT SERPL: 16.9 (ref 7–25)
CALCIUM SPEC-SCNC: 9.3 MG/DL (ref 8.6–10.5)
CHLORIDE SERPL-SCNC: 103 MMOL/L (ref 98–107)
CLARITY UR: ABNORMAL
CO2 SERPL-SCNC: 25.2 MMOL/L (ref 22–29)
COLOR UR: YELLOW
CREAT SERPL-MCNC: 0.77 MG/DL (ref 0.57–1)
DEPRECATED RDW RBC AUTO: 41.8 FL (ref 37–54)
EOSINOPHIL # BLD AUTO: 0.08 10*3/MM3 (ref 0–0.4)
EOSINOPHIL NFR BLD AUTO: 0.7 % (ref 0.3–6.2)
ERYTHROCYTE [DISTWIDTH] IN BLOOD BY AUTOMATED COUNT: 13.8 % (ref 12.3–15.4)
GFR SERPL CREATININE-BSD FRML MDRD: 87 ML/MIN/1.73
GLOBULIN UR ELPH-MCNC: 2.9 GM/DL
GLUCOSE SERPL-MCNC: 108 MG/DL (ref 65–99)
GLUCOSE UR STRIP-MCNC: NEGATIVE MG/DL
HCT VFR BLD AUTO: 41.1 % (ref 34–46.6)
HGB BLD-MCNC: 13.5 G/DL (ref 12–15.9)
HGB UR QL STRIP.AUTO: ABNORMAL
HOLD SPECIMEN: NORMAL
HYALINE CASTS UR QL AUTO: ABNORMAL /LPF
IMM GRANULOCYTES # BLD AUTO: 0.02 10*3/MM3 (ref 0–0.05)
IMM GRANULOCYTES NFR BLD AUTO: 0.2 % (ref 0–0.5)
KETONES UR QL STRIP: NEGATIVE
LEUKOCYTE ESTERASE UR QL STRIP.AUTO: ABNORMAL
LIPASE SERPL-CCNC: 15 U/L (ref 13–60)
LYMPHOCYTES # BLD AUTO: 2.62 10*3/MM3 (ref 0.7–3.1)
LYMPHOCYTES NFR BLD AUTO: 24.1 % (ref 19.6–45.3)
MCH RBC QN AUTO: 27.6 PG (ref 26.6–33)
MCHC RBC AUTO-ENTMCNC: 32.8 G/DL (ref 31.5–35.7)
MCV RBC AUTO: 83.9 FL (ref 79–97)
MONOCYTES # BLD AUTO: 0.45 10*3/MM3 (ref 0.1–0.9)
MONOCYTES NFR BLD AUTO: 4.1 % (ref 5–12)
MUCOUS THREADS URNS QL MICRO: ABNORMAL /HPF
NEUTROPHILS NFR BLD AUTO: 7.68 10*3/MM3 (ref 1.7–7)
NEUTROPHILS NFR BLD AUTO: 70.6 % (ref 42.7–76)
NITRITE UR QL STRIP: NEGATIVE
NRBC BLD AUTO-RTO: 0 /100 WBC (ref 0–0.2)
PH UR STRIP.AUTO: <=5 [PH] (ref 4.5–8)
PLATELET # BLD AUTO: 278 10*3/MM3 (ref 140–450)
PMV BLD AUTO: 10.8 FL (ref 6–12)
POTASSIUM SERPL-SCNC: 4.1 MMOL/L (ref 3.5–5.2)
PROT SERPL-MCNC: 7 G/DL (ref 6–8.5)
PROT UR QL STRIP: ABNORMAL
RBC # BLD AUTO: 4.9 10*6/MM3 (ref 3.77–5.28)
RBC # UR: ABNORMAL /HPF
REF LAB TEST METHOD: ABNORMAL
SODIUM SERPL-SCNC: 139 MMOL/L (ref 136–145)
SP GR UR STRIP: 1.02 (ref 1–1.03)
SQUAMOUS #/AREA URNS HPF: ABNORMAL /HPF
UROBILINOGEN UR QL STRIP: ABNORMAL
WBC # BLD AUTO: 10.88 10*3/MM3 (ref 3.4–10.8)
WBC UR QL AUTO: ABNORMAL /HPF
WHOLE BLOOD HOLD SPECIMEN: NORMAL

## 2021-06-01 PROCEDURE — 99284 EMERGENCY DEPT VISIT MOD MDM: CPT | Performed by: EMERGENCY MEDICINE

## 2021-06-01 PROCEDURE — 85025 COMPLETE CBC W/AUTO DIFF WBC: CPT | Performed by: EMERGENCY MEDICINE

## 2021-06-01 PROCEDURE — 74176 CT ABD & PELVIS W/O CONTRAST: CPT

## 2021-06-01 PROCEDURE — 83690 ASSAY OF LIPASE: CPT | Performed by: EMERGENCY MEDICINE

## 2021-06-01 PROCEDURE — 99283 EMERGENCY DEPT VISIT LOW MDM: CPT

## 2021-06-01 PROCEDURE — 25010000002 ONDANSETRON PER 1 MG: Performed by: EMERGENCY MEDICINE

## 2021-06-01 PROCEDURE — 96375 TX/PRO/DX INJ NEW DRUG ADDON: CPT

## 2021-06-01 PROCEDURE — 25010000002 MORPHINE PER 10 MG: Performed by: EMERGENCY MEDICINE

## 2021-06-01 PROCEDURE — 25010000002 KETOROLAC TROMETHAMINE PER 15 MG: Performed by: EMERGENCY MEDICINE

## 2021-06-01 PROCEDURE — 96374 THER/PROPH/DIAG INJ IV PUSH: CPT

## 2021-06-01 PROCEDURE — 80053 COMPREHEN METABOLIC PANEL: CPT | Performed by: EMERGENCY MEDICINE

## 2021-06-01 PROCEDURE — 81001 URINALYSIS AUTO W/SCOPE: CPT | Performed by: EMERGENCY MEDICINE

## 2021-06-01 PROCEDURE — 81025 URINE PREGNANCY TEST: CPT | Performed by: EMERGENCY MEDICINE

## 2021-06-01 RX ORDER — KETOROLAC TROMETHAMINE 30 MG/ML
15 INJECTION, SOLUTION INTRAMUSCULAR; INTRAVENOUS ONCE
Status: COMPLETED | OUTPATIENT
Start: 2021-06-01 | End: 2021-06-01

## 2021-06-01 RX ORDER — ONDANSETRON 4 MG/1
4 TABLET, ORALLY DISINTEGRATING ORAL EVERY 8 HOURS PRN
Qty: 3 TABLET | Refills: 0 | Status: SHIPPED | OUTPATIENT
Start: 2021-06-01 | End: 2021-06-04

## 2021-06-01 RX ORDER — CEPHALEXIN 500 MG/1
500 CAPSULE ORAL 4 TIMES DAILY
Qty: 28 CAPSULE | Refills: 0 | Status: SHIPPED | OUTPATIENT
Start: 2021-06-01 | End: 2021-06-08

## 2021-06-01 RX ORDER — HYDROCODONE BITARTRATE AND ACETAMINOPHEN 5; 325 MG/1; MG/1
1 TABLET ORAL EVERY 6 HOURS PRN
Qty: 12 TABLET | Refills: 0 | Status: SHIPPED | OUTPATIENT
Start: 2021-06-01 | End: 2021-06-04

## 2021-06-01 RX ORDER — ONDANSETRON 2 MG/ML
4 INJECTION INTRAMUSCULAR; INTRAVENOUS ONCE
Status: COMPLETED | OUTPATIENT
Start: 2021-06-01 | End: 2021-06-01

## 2021-06-01 RX ORDER — TRAZODONE HYDROCHLORIDE 50 MG/1
100 TABLET ORAL
COMMUNITY
Start: 2021-05-21 | End: 2023-01-10 | Stop reason: SDUPTHER

## 2021-06-01 RX ORDER — SODIUM CHLORIDE 0.9 % (FLUSH) 0.9 %
10 SYRINGE (ML) INJECTION AS NEEDED
Status: DISCONTINUED | OUTPATIENT
Start: 2021-06-01 | End: 2021-06-01 | Stop reason: HOSPADM

## 2021-06-01 RX ADMIN — MORPHINE SULFATE 4 MG: 4 INJECTION INTRAVENOUS at 06:32

## 2021-06-01 RX ADMIN — ONDANSETRON 4 MG: 2 INJECTION INTRAMUSCULAR; INTRAVENOUS at 06:25

## 2021-06-01 RX ADMIN — SODIUM CHLORIDE, POTASSIUM CHLORIDE, SODIUM LACTATE AND CALCIUM CHLORIDE 1000 ML: 600; 310; 30; 20 INJECTION, SOLUTION INTRAVENOUS at 06:25

## 2021-06-01 RX ADMIN — KETOROLAC TROMETHAMINE 15 MG: 30 INJECTION, SOLUTION INTRAMUSCULAR; INTRAVENOUS at 07:10

## 2021-06-01 NOTE — ED PROVIDER NOTES
Subjective   30-year-old female presents with acute onset of right flank pain which woke her from sleep around 5 AM this morning.  She reports pain was 10 out of 10 at onset, now about a 7 out of 10.  Described as sharp.  No identified aggravating or relieving factors.  Patient is also had one episode of vomiting and still has some mild nausea.  Has not had bowel movement since incident.  Has urinated without incident.  Denies dysuria.  Denies hematuria.  No vaginal bleeding or discharge.  Is due to start her next menstrual period in a few days.  Patient has history of cholecystectomy.  Denies history of any renal stone disease.  Has otherwise felt well recently.  No medications prior to arrival.          Review of Systems   Constitutional: Negative.    HENT: Negative.    Eyes: Negative.    Respiratory: Negative.    Cardiovascular: Negative.    Gastrointestinal:        Per HPI, otherwise negative   Endocrine: Negative.    Genitourinary:        Per HPI, otherwise negative   Musculoskeletal: Negative.    Skin: Negative.    Neurological: Negative.    Hematological: Negative.    Psychiatric/Behavioral: Negative.        Past Medical History:   Diagnosis Date   • Anxiety    • Clostridium difficile diarrhea 2018   • GERD (gastroesophageal reflux disease)    • IBS (irritable bowel syndrome)    • Postural hypotension    • Sepsis (CMS/[facility]) 04/2014       Allergies   Allergen Reactions   • Sulfa Antibiotics Rash   • Sulfasalazine Rash   • Hydrocodone-Acetaminophen Nausea And Vomiting       Past Surgical History:   Procedure Laterality Date   • CHOLECYSTECTOMY     • WISDOM TOOTH EXTRACTION         Family History   Problem Relation Age of Onset   • Multiple myeloma Maternal Grandmother    • Heart disease Maternal Grandfather    • Diabetes Paternal Grandfather        Social History     Socioeconomic History   • Marital status:      Spouse name: Not on file   • Number of children: Not on file   • Years of education: Not on  file   • Highest education level: Not on file   Tobacco Use   • Smoking status: Never Smoker   • Smokeless tobacco: Never Used   Substance and Sexual Activity   • Alcohol use: No   • Drug use: No   • Sexual activity: Yes     Partners: Male     Comment:            Objective   Physical Exam  Constitutional:       General: She is not in acute distress.     Comments: Appears uncomfortable but in no acute distress.   HENT:      Head: Normocephalic and atraumatic.   Eyes:      Extraocular Movements: Extraocular movements intact.      Pupils: Pupils are equal, round, and reactive to light.   Cardiovascular:      Rate and Rhythm: Normal rate and regular rhythm.      Pulses: Normal pulses.      Heart sounds: Normal heart sounds.   Pulmonary:      Effort: Pulmonary effort is normal. No respiratory distress.      Breath sounds: Normal breath sounds.   Abdominal:      General: There is no distension.      Palpations: Abdomen is soft.      Tenderness: There is no abdominal tenderness. There is right CVA tenderness. There is no left CVA tenderness, guarding or rebound.   Musculoskeletal:         General: No tenderness or deformity.   Skin:     General: Skin is warm and dry.      Capillary Refill: Capillary refill takes less than 2 seconds.   Neurological:      General: No focal deficit present.      Mental Status: She is alert and oriented to person, place, and time. Mental status is at baseline.   Psychiatric:         Mood and Affect: Mood normal.         Behavior: Behavior normal.         Thought Content: Thought content normal.         Judgment: Judgment normal.         Procedures           ED Course  ED Course as of Jun 01 0712   Tue Jun 01, 2021   0711 Patient found to have 3 mm proximal ureteral stone.  Mild hydronephrosis.  Patient also has 1+ bacteria but white blood cell count not elevated, patient overall nontoxic-appearing.  Normal renal function.  Patient initially given morphine, will also add Toradol and if  able to reasonably control patient's symptoms here will plan for discharge with outpatient follow-up with urology.  Patient updated on all findings and is agreeable with this plan.    [TD]      ED Course User Index  [TD] Larry Deluca MD                                           [facility]    Final diagnoses:   Kidney stone       ED Disposition  ED Disposition     ED Disposition Condition Comment    Discharge Stable           Gilberto Ramirez MD  [address]  [place] [address] [phone]    Call today           Medication List      New Prescriptions    cephalexin 500 MG capsule  Commonly known as: KEFLEX  Take 1 capsule by mouth 4 (Four) Times a Day for 7 days.     HYDROcodone-acetaminophen 5-325 MG per tablet  Commonly known as: NORCO  Take 1 tablet by mouth Every 6 (Six) Hours As Needed (Pain) for up to 3 days.     ondansetron ODT 4 MG disintegrating tablet  Commonly known as: ZOFRAN-ODT  Place 1 tablet on the tongue Every 8 (Eight) Hours As Needed for Nausea or Vomiting for up to 3 days.        Stop    buPROPion  MG 12 hr tablet  Commonly known as: WELLBUTRIN SR     hyoscyamine 0.125 MG/5ML elixir  Commonly known as: LEVSIN           Where to Get Your Medications      These medications were sent to [facility] [address] [place], [place] [address], [place] - [address] [facility] - [phone]  - [phone]  [address] [address]    Phone: [phone]   · cephalexin 500 MG capsule  · HYDROcodone-acetaminophen 5-325 MG per tablet  · ondansetron ODT 4 MG disintegrating tablet          Larry Deluca MD  06/01/21 0731

## 2022-02-18 ENCOUNTER — HOSPITAL ENCOUNTER (EMERGENCY)
Facility: HOSPITAL | Age: 32
Discharge: HOME | End: 2022-02-18
Attending: EMERGENCY MEDICINE | Admitting: EMERGENCY MEDICINE

## 2022-02-18 ENCOUNTER — APPOINTMENT (OUTPATIENT)
Dept: GENERAL RADIOLOGY | Facility: HOSPITAL | Age: 32
End: 2022-02-18

## 2022-02-18 VITALS
BODY MASS INDEX: 37.19 KG/M2 | RESPIRATION RATE: 24 BRPM | HEIGHT: 61 IN | HEART RATE: 109 BPM | SYSTOLIC BLOOD PRESSURE: 135 MMHG | WEIGHT: 197 LBS | TEMPERATURE: 99 F | DIASTOLIC BLOOD PRESSURE: 88 MMHG | OXYGEN SATURATION: 100 %

## 2022-02-18 DIAGNOSIS — S39.92XA TAILBONE INJURY, INITIAL ENCOUNTER: ICD-10-CM
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
## 2022-02-18 PROCEDURE — 73502 X-RAY EXAM HIP UNI 2-3 VIEWS: CPT

## 2022-02-18 PROCEDURE — 99283 EMERGENCY DEPT VISIT LOW MDM: CPT

## 2022-02-18 PROCEDURE — 99283 EMERGENCY DEPT VISIT LOW MDM: CPT | Performed by: PHYSICIAN ASSISTANT

## 2022-02-18 PROCEDURE — 96372 THER/PROPH/DIAG INJ SC/IM: CPT

## 2022-02-18 PROCEDURE — 25010000002 KETOROLAC TROMETHAMINE PER 15 MG: Performed by: PHYSICIAN ASSISTANT

## 2022-02-18 RX ORDER — KETOROLAC TROMETHAMINE 30 MG/ML
30 INJECTION, SOLUTION INTRAMUSCULAR; INTRAVENOUS EVERY 6 HOURS PRN
Status: DISCONTINUED | OUTPATIENT
Start: 2022-02-18 | End: 2022-02-18 | Stop reason: HOSPADM

## 2022-02-18 RX ORDER — DICYCLOMINE HYDROCHLORIDE 10 MG/1
10 CAPSULE ORAL 2 TIMES DAILY
COMMUNITY
End: 2023-01-10 | Stop reason: SDUPTHER

## 2022-02-18 RX ORDER — HYDROXYZINE HYDROCHLORIDE 10 MG/1
20 TABLET, FILM COATED ORAL NIGHTLY
COMMUNITY
End: 2023-01-10 | Stop reason: SDUPTHER

## 2022-02-18 RX ADMIN — KETOROLAC TROMETHAMINE 30 MG: 30 INJECTION, SOLUTION INTRAMUSCULAR; INTRAVENOUS at 17:38

## 2022-02-18 NOTE — DISCHARGE INSTRUCTIONS
Ibuprofen and Tylenol to help with pain.  You can also try ice packs or heat packs to see if this helps.  Using a donut or a soft pillow to sit on will relieve pressure on your backside.  With primary care as needed.

## 2022-02-18 NOTE — ED PROVIDER NOTES
EMERGENCY DEPARTMENT ENCOUNTER      Room Number: 10/10    History is provided by the patient, no translation services needed    HPI:    Chief complaint: fall, right hip pain    Narrative: Pt is a 31 y.o. female who presents complaining of a fall and right hip pain.  Patient reports she was standing on a stool reaching above the fridge and the stool slipped to the side and she fell on her right hip.  Reports she laid on the kitchen floor for a while until her  was able to come home and help her up.  Reports she does have a lot of pain with ambulation however it is primarily in her tailbone.  She states when she does bear weight she feels shooting pains come up her right leg.  Reports no numbness or tingling at rest.  No previous history of fractures to the area.  She is not on any blood thinners.  She did not hit her head or report loss of consciousness.  No other complaints.      PMD: Maria Isabel Easton PA    REVIEW OF SYSTEMS  Review of Systems  Complete review of systems performed otherwise negative except pertinent positives per HPI.    PAST MEDICAL HISTORY  Active Ambulatory Problems     Diagnosis Date Noted   • Heart palpitations 03/21/2016     Resolved Ambulatory Problems     Diagnosis Date Noted   • No Resolved Ambulatory Problems     Past Medical History:   Diagnosis Date   • Anxiety    • Clostridium difficile diarrhea 2018   • GERD (gastroesophageal reflux disease)    • IBS (irritable bowel syndrome)    • Postural hypotension    • Sepsis (HCC) 04/2014       PAST SURGICAL HISTORY  Past Surgical History:   Procedure Laterality Date   • CHOLECYSTECTOMY     • WISDOM TOOTH EXTRACTION         FAMILY HISTORY  Family History   Problem Relation Age of Onset   • Multiple myeloma Maternal Grandmother    • Heart disease Maternal Grandfather    • Diabetes Paternal Grandfather        SOCIAL HISTORY  Social History     Socioeconomic History   • Marital status:    Tobacco Use   • Smoking status: Never Smoker    • Smokeless tobacco: Never Used   Substance and Sexual Activity   • Alcohol use: No   • Drug use: No   • Sexual activity: Yes     Partners: Male     Comment:        ALLERGIES  Sulfa antibiotics, Sulfasalazine, and Hydrocodone-acetaminophen      Current Facility-Administered Medications:   •  ketorolac (TORADOL) injection 30 mg, 30 mg, Intramuscular, Q6H PRN, Calli BautistaOTF, 30 mg at 02/18/22 1738    Current Outpatient Medications:   •  dicyclomine (BENTYL) 10 MG capsule, Take 10 mg by mouth 2 (Two) Times a Day., Disp: , Rfl:   •  hydrOXYzine (ATARAX) 10 MG tablet, Take 20 mg by mouth Every Night., Disp: , Rfl:   •  atorvastatin (LIPITOR) 80 MG tablet, , Disp: , Rfl:   •  traZODone (DESYREL) 50 MG tablet, Take 100 mg by mouth., Disp: , Rfl:     PHYSICAL EXAM  ED Triage Vitals [02/18/22 1709]   Temp Heart Rate Resp BP SpO2   99 °F (37.2 °C) 109 24 135/88 100 %      Temp src Heart Rate Source Patient Position BP Location FiO2 (%)   Oral Monitor -- -- --       Physical Exam  Vitals and nursing note reviewed.   Constitutional:       Appearance: Normal appearance. She is obese. She is not ill-appearing or toxic-appearing.   HENT:      Head: Normocephalic and atraumatic.      Nose: Nose normal.      Mouth/Throat:      Mouth: Mucous membranes are moist.   Eyes:      Extraocular Movements: Extraocular movements intact.      Conjunctiva/sclera: Conjunctivae normal.      Pupils: Pupils are equal, round, and reactive to light.   Cardiovascular:      Rate and Rhythm: Normal rate and regular rhythm.      Pulses: Normal pulses.   Pulmonary:      Effort: Pulmonary effort is normal.      Breath sounds: No wheezing.   Abdominal:      General: Abdomen is flat.      Palpations: Abdomen is soft.      Tenderness: There is no abdominal tenderness.   Musculoskeletal:         General: Tenderness (Right buttock and tailbone area with palpation.  No overlying skin changes.) present. No swelling or deformity. Normal range of  motion.      Cervical back: Normal range of motion.   Skin:     General: Skin is warm.      Capillary Refill: Capillary refill takes less than 2 seconds.      Coloration: Skin is not pale.      Findings: No bruising or rash.   Neurological:      General: No focal deficit present.      Mental Status: She is alert and oriented to person, place, and time.      Sensory: No sensory deficit.   Psychiatric:         Mood and Affect: Mood normal.           LAB RESULTS  Lab Results (last 24 hours)     ** No results found for the last 24 hours. **            I ordered the above labs and reviewed the results    RADIOLOGY  XR Hip With or Without Pelvis 2 - 3 View Right    Result Date: 2/18/2022  CR Hip Uni Comp Min 2 Vws RT INDICATION: Patient has hip pain after falling from a bar stool landing on right posterior hip. COMPARISON: None. FINDINGS: AP pelvis and AP and frog-leg lateral view(s) of the right hip.  No fracture or dislocation. No bone erosion or destruction.      Negative right hip and plain film pelvis. Signer Name: Nadia Navarro MD  Signed: 2/18/2022 5:52 PM  Workstation Name: [email or web address]  [facility]      I ordered the above radiologic testing and reviewed the results    PROCEDURES  Procedures      PROGRESS AND CONSULTS  ED Course as of 02/18/22 1819   Fri Feb 18, 2022   1800 XR hip: negative for fracture  [KM]      ED Course User Index  [KM] Calli Bautista, OTF           MEDICAL DECISION MAKING    MDM     31-year-old female seen and evaluated after a fall.  Patient is experiencing pain in her tailbone and right hip.  On arrival her vitals are 135/88, heart rate of 109 she is afebrile and 100% on room air.  On physical exam no overlying skin marks in the area where she is feeling maximal tenderness.  No obvious deformities.  Sensation and motor function intact of her lower extremities.  Patient given a 30 mg injection of Toradol to help with pain.    X-ray of the pelvis and hip performed  which is negative for acute fracture.    I then assisted patient in getting up from bed and she was able to bear weight on her legs and reports the shooting pain she was experiencing earlier have now gone away.  She does report she has pretty severe pain in her tailbone area and right buttock with standing regardless of which leg she bears her weight on.  Encouraged her to use it on a soft pillow or a doughnut.  She was also provided with crutches to help with ambulation at home.  We did discuss returning to work and following up with primary care.  We also talked about using Tylenol and ibuprofen at home.  Patient was agreeable to this plan she was discharged home in stable condition.    DIAGNOSIS  Final diagnoses:   Fall, initial encounter   Tailbone injury, initial encounter       Latest Documented Vital Signs:  As of 18:19 EST  BP- 135/88 HR- 109 Temp- 99 °F (37.2 °C) (Oral) O2 sat- 100%    DISPOSITION    Discussed pertinent findings with the patient/family.  Patient/Family voiced understanding of need to follow-up for recheck and further testing as needed.  Return to the Emergency Department warnings were given.         Medication List      No changes were made to your prescriptions during this visit.              Follow-up Information     Call  Maria Isabel Easton PA.    Specialty: Physician Assistant  Why: To schedule follow up appointment  Contact information:  [address] [facility]  [place] [address] [phone]                           Dictated utilizing Dragon dictation     Calli Bautista PA-C  02/18/22 3761

## 2023-01-10 ENCOUNTER — OFFICE VISIT (OUTPATIENT)
Dept: OBSTETRICS AND GYNECOLOGY | Facility: CLINIC | Age: 33
End: 2023-01-10
Payer: COMMERCIAL

## 2023-01-10 VITALS
SYSTOLIC BLOOD PRESSURE: 118 MMHG | WEIGHT: 204 LBS | HEIGHT: 61 IN | DIASTOLIC BLOOD PRESSURE: 78 MMHG | BODY MASS INDEX: 38.51 KG/M2

## 2023-01-10 DIAGNOSIS — N92.0 MENORRHAGIA WITH REGULAR CYCLE: ICD-10-CM

## 2023-01-10 DIAGNOSIS — Z01.419 PAP SMEAR, LOW-RISK: ICD-10-CM

## 2023-01-10 DIAGNOSIS — Z01.419 ROUTINE GYNECOLOGICAL EXAMINATION: Primary | ICD-10-CM

## 2023-01-10 DIAGNOSIS — Z11.51 SPECIAL SCREENING EXAMINATION FOR HUMAN PAPILLOMAVIRUS (HPV): ICD-10-CM

## 2023-01-10 LAB
B-HCG UR QL: NEGATIVE
BILIRUB BLD-MCNC: NEGATIVE MG/DL
CLARITY, POC: CLEAR
COLOR UR: YELLOW
EXPIRATION DATE: NORMAL
GLUCOSE UR STRIP-MCNC: NEGATIVE MG/DL
INTERNAL NEGATIVE CONTROL: NORMAL
INTERNAL POSITIVE CONTROL: NORMAL
KETONES UR QL: NEGATIVE
LEUKOCYTE EST, POC: NEGATIVE
Lab: NORMAL
NITRITE UR-MCNC: NEGATIVE MG/ML
PH UR: 5 [PH] (ref 5–8)
PROT UR STRIP-MCNC: NEGATIVE MG/DL
RBC # UR STRIP: NEGATIVE /UL
SP GR UR: 1.03 (ref 1–1.03)
UROBILINOGEN UR QL: NORMAL

## 2023-01-10 PROCEDURE — 99395 PREV VISIT EST AGE 18-39: CPT | Performed by: OBSTETRICS & GYNECOLOGY

## 2023-01-10 PROCEDURE — 99213 OFFICE O/P EST LOW 20 MIN: CPT | Performed by: OBSTETRICS & GYNECOLOGY

## 2023-01-10 PROCEDURE — 81002 URINALYSIS NONAUTO W/O SCOPE: CPT | Performed by: OBSTETRICS & GYNECOLOGY

## 2023-01-10 PROCEDURE — 81025 URINE PREGNANCY TEST: CPT | Performed by: OBSTETRICS & GYNECOLOGY

## 2023-01-10 RX ORDER — SODIUM CHLORIDE 0.9 % (FLUSH) 0.9 %
10 SYRINGE (ML) INJECTION EVERY 12 HOURS SCHEDULED
Status: CANCELLED | OUTPATIENT
Start: 2023-01-10

## 2023-01-10 RX ORDER — ATORVASTATIN CALCIUM 80 MG/1
1 TABLET, FILM COATED ORAL DAILY
COMMUNITY
Start: 2022-11-23

## 2023-01-10 RX ORDER — HYDROXYZINE PAMOATE 25 MG/1
CAPSULE ORAL
COMMUNITY
Start: 2023-01-08

## 2023-01-10 RX ORDER — TOPIRAMATE 50 MG/1
50 TABLET, FILM COATED ORAL
COMMUNITY

## 2023-01-10 RX ORDER — RIMEGEPANT SULFATE 75 MG/75MG
TABLET, ORALLY DISINTEGRATING ORAL
COMMUNITY
Start: 2022-10-07

## 2023-01-10 RX ORDER — HYDROXYZINE HYDROCHLORIDE 25 MG/1
25 TABLET, FILM COATED ORAL EVERY 6 HOURS PRN
COMMUNITY

## 2023-01-10 RX ORDER — SODIUM CHLORIDE 9 MG/ML
40 INJECTION, SOLUTION INTRAVENOUS AS NEEDED
Status: CANCELLED | OUTPATIENT
Start: 2023-01-10

## 2023-01-10 RX ORDER — FAMOTIDINE 20 MG/1
TABLET, FILM COATED ORAL
COMMUNITY
Start: 2022-09-08

## 2023-01-10 RX ORDER — CYANOCOBALAMIN 1000 UG/ML
INJECTION, SOLUTION INTRAMUSCULAR; SUBCUTANEOUS
COMMUNITY
Start: 2022-08-18

## 2023-01-10 RX ORDER — RIZATRIPTAN BENZOATE 10 MG/1
10 TABLET ORAL
COMMUNITY
Start: 2022-09-13

## 2023-01-10 RX ORDER — DICYCLOMINE HYDROCHLORIDE 10 MG/1
1 CAPSULE ORAL 2 TIMES DAILY
COMMUNITY
Start: 2022-07-21

## 2023-01-10 RX ORDER — SODIUM CHLORIDE 0.9 % (FLUSH) 0.9 %
1-10 SYRINGE (ML) INJECTION AS NEEDED
Status: CANCELLED | OUTPATIENT
Start: 2023-01-10

## 2023-01-10 NOTE — PROGRESS NOTES
GYN Annual Exam     CC- Here for annual exam.     Denise Acosta is a 32 y.o. female who presents for annual well woman exam. Periods are regular every 28-30 days, lasting 5 days. Dysmenorrhea:moderate, occurring first 1-2 days of flow. Cyclic symptoms include none. No intermenstrual bleeding, spotting, or discharge. Wants laspingectomy for BC and ablation for menorrhagia      OB History        1    Para   1    Term           1    AB        Living   1       SAB        IAB        Ectopic        Molar        Multiple        Live Births   1                Current contraception: vasectomy  History of abnormal Pap smear: no  Family history of uterine, colon or ovarian cancer: no  History of abnormal mammogram: no  Family history of breast cancer: no  Last Pap :  Normal/Normal    Past Medical History:   Diagnosis Date   • Anxiety    • Clostridium difficile diarrhea    • GERD (gastroesophageal reflux disease)    • IBS (irritable bowel syndrome)    • Postural hypotension    • Sepsis (HCC) 2014       Past Surgical History:   Procedure Laterality Date   • CHOLECYSTECTOMY     • WISDOM TOOTH EXTRACTION           Current Outpatient Medications:   •  atorvastatin (LIPITOR) 80 MG tablet, Take 1 tablet by mouth Daily., Disp: , Rfl:   •  cyanocobalamin 1000 MCG/ML injection, Inject 1ml IM once a week for 4 weeks and then once a month after that., Disp: , Rfl:   •  dicyclomine (BENTYL) 10 MG capsule, Take 1 capsule by mouth 2 (Two) Times a Day., Disp: , Rfl:   •  famotidine (PEPCID) 20 MG tablet, , Disp: , Rfl:   •  Rimegepant Sulfate (Nurtec) 75 MG tablet dispersible tablet, DISSOLVE 1 TABLET ON THE TONGUE 1 TIME EVERY 2 DAYS AS NEEDED FOR MIGRAINE, Disp: , Rfl:   •  rizatriptan (MAXALT) 10 MG tablet, Take 10 mg by mouth., Disp: , Rfl:   •  hydrOXYzine (ATARAX) 25 MG tablet, Take 25 mg by mouth Every 6 (Six) Hours As Needed., Disp: , Rfl:   •  hydrOXYzine pamoate (VISTARIL) 25 MG capsule, , Disp: , Rfl:    •  topiramate (TOPAMAX) 50 MG tablet, Take 50 mg by mouth., Disp: , Rfl:     Allergies   Allergen Reactions   • Sulfa Antibiotics Rash   • Sulfasalazine Rash and Other (See Comments)   • Sulfamethoxazole-Trimethoprim Other (See Comments)   • Etodolac Hives     Annotation - [id number]: hives  Annotation - 04Nov2019: hives  Annotation - 04Nov2019: hives     • Hydrocodone-Acetaminophen Nausea And Vomiting   • Methocarbamol Hives     Annotation - 04Nov2019: hives  Annotation - 04Nov2019: hives  Annotation - 04Nov2019: hives     • Sumatriptan Rash       Social History     Tobacco Use   • Smoking status: Never   • Smokeless tobacco: Never   Substance Use Topics   • Alcohol use: No   • Drug use: No         Family History   Problem Relation Age of Onset   • Multiple myeloma Maternal Grandmother    • Heart disease Maternal Grandfather    • Diabetes Paternal Grandfather        Review of Systems   Constitutional: Negative for appetite change, fever and unexpected weight change.   HENT: Negative for congestion and sore throat.    Respiratory: Negative for cough and shortness of breath.    Cardiovascular: Negative for chest pain and palpitations.   Gastrointestinal: Negative for abdominal distention, abdominal pain, constipation, diarrhea, nausea and vomiting.   Endocrine: Negative.    Genitourinary: Positive for menstrual problem (menoorhagia). Negative for dyspareunia, pelvic pain and vaginal discharge.   Skin: Negative.    Neurological: Negative for dizziness and syncope.   Hematological: Negative.    Psychiatric/Behavioral: Negative for dysphoric mood and sleep disturbance. The patient is not nervous/anxious.        /78   Ht 154.9 cm (61\")   Wt 92.5 kg (204 lb)   LMP 12/31/2022   BMI 38.55 kg/m²     Physical Exam  Vitals and nursing note reviewed. Exam conducted with a chaperone present.   Constitutional:       Appearance: She is well-developed.   HENT:      Head: Normocephalic and atraumatic.   Neck:       Thyroid: No thyromegaly.   Cardiovascular:      Rate and Rhythm: Normal rate and regular rhythm.   Pulmonary:      Effort: Pulmonary effort is normal.      Breath sounds: Normal breath sounds.   Chest:   Breasts:     Breasts are symmetrical.      Right: No mass or nipple discharge.      Left: No mass or nipple discharge.   Abdominal:      General: Bowel sounds are normal. There is no distension.      Palpations: Abdomen is soft. There is no mass.      Tenderness: There is no abdominal tenderness. There is no guarding or rebound.   Genitourinary:     General: Normal vulva.      Exam position: Supine.      Labia:         Right: No lesion.         Left: No lesion.       Vagina: Normal.      Cervix: No cervical motion tenderness or discharge.      Uterus: Normal.       Adnexa:         Right: No mass.          Left: No mass.     Musculoskeletal:         General: Normal range of motion.      Cervical back: Normal range of motion and neck supple.   Skin:     General: Skin is warm and dry.   Neurological:      Mental Status: She is alert and oriented to person, place, and time.   Psychiatric:         Behavior: Behavior normal.         Thought Content: Thought content normal.         Judgment: Judgment normal.         Diagnoses and all orders for this visit:    Routine gynecological examination  -     POC Urinalysis Dipstick  -     POC Pregnancy, Urine  -     IGP, Apt HPV,rfx 16 / 18,45    Special screening examination for human papillomavirus (HPV)  -     IGP, Apt HPV,rfx 16 / 18,45    Pap smear, low-risk  -     IGP, Apt HPV,rfx 16 / 18,45    Other orders  -     atorvastatin (LIPITOR) 80 MG tablet; Take 1 tablet by mouth Daily.  -     cyanocobalamin 1000 MCG/ML injection; Inject 1ml IM once a week for 4 weeks and then once a month after that.  -     famotidine (PEPCID) 20 MG tablet  -     dicyclomine (BENTYL) 10 MG capsule; Take 1 capsule by mouth 2 (Two) Times a Day.  -     hydrOXYzine (ATARAX) 25 MG tablet; Take 25 mg by  mouth Every 6 (Six) Hours As Needed.  -     hydrOXYzine pamoate (VISTARIL) 25 MG capsule  -     topiramate (TOPAMAX) 50 MG tablet; Take 50 mg by mouth.  -     rizatriptan (MAXALT) 10 MG tablet; Take 10 mg by mouth.  -     Rimegepant Sulfate (Nurtec) 75 MG tablet dispersible tablet; DISSOLVE 1 TABLET ON THE TONGUE 1 TIME EVERY 2 DAYS AS NEEDED FOR MIGRAINE        Assessment     1) GYN annual well woman exam.   2) menorrhagia-patient wants bilateral laparoscopic salpingectomy for birth control and NovaSure ablation for menorrhagia.  Check ultrasound first.  Risk benefits alternatives discussed with patient.  Patient desires to schedule.     Plan     1) Breast Health - Clinical breast exam & mammogram yearly, Self breast awareness monthly  2) Pap - done today  3) Smoking status- Denise Acosta  reports that she has never smoked. She has never used smokeless tobacco.. I have educated her on the risk of diseases from using tobacco products such as cancer, COPD and heart disease.   4) Colon health - screening colonoscopy recommended if not up to date  5) Bone health - Weight bearing exercise, dietary calcium recommendations and vitamin D reviewed.   6) Seat belts recommended  7) Follow up prn and one year    Encounter Diagnoses   Name Primary?   • Routine gynecological examination Yes   • Special screening examination for human papillomavirus (HPV)    • Pap smear, low-risk          Troy Castro MD  1/10/2023  16:02 EST

## 2023-01-13 LAB
CYTOLOGIST CVX/VAG CYTO: NORMAL
CYTOLOGY CVX/VAG DOC CYTO: NORMAL
CYTOLOGY CVX/VAG DOC THIN PREP: NORMAL
DX ICD CODE: NORMAL
HIV 1 & 2 AB SER-IMP: NORMAL
HPV I/H RISK 4 DNA CVX QL PROBE+SIG AMP: NEGATIVE
OTHER STN SPEC: NORMAL
STAT OF ADQ CVX/VAG CYTO-IMP: NORMAL

## 2023-07-10 ENCOUNTER — TELEPHONE (OUTPATIENT)
Dept: OBSTETRICS AND GYNECOLOGY | Facility: CLINIC | Age: 33
End: 2023-07-10

## 2023-08-01 ENCOUNTER — OFFICE VISIT (OUTPATIENT)
Dept: OBSTETRICS AND GYNECOLOGY | Facility: CLINIC | Age: 33
End: 2023-08-01
Payer: COMMERCIAL

## 2023-08-01 VITALS
SYSTOLIC BLOOD PRESSURE: 118 MMHG | BODY MASS INDEX: 38.55 KG/M2 | DIASTOLIC BLOOD PRESSURE: 76 MMHG | HEIGHT: 61 IN | WEIGHT: 204.2 LBS

## 2023-08-01 DIAGNOSIS — Z13.89 SCREENING FOR GENITOURINARY CONDITION: ICD-10-CM

## 2023-08-01 DIAGNOSIS — N92.0 MENORRHAGIA WITH REGULAR CYCLE: Primary | ICD-10-CM

## 2023-08-01 LAB
BILIRUB BLD-MCNC: NEGATIVE MG/DL
CLARITY, POC: CLEAR
COLOR UR: YELLOW
GLUCOSE UR STRIP-MCNC: NEGATIVE MG/DL
KETONES UR QL: NEGATIVE
LEUKOCYTE EST, POC: NEGATIVE
NITRITE UR-MCNC: NEGATIVE MG/ML
PH UR: 5 [PH] (ref 5–8)
PROT UR STRIP-MCNC: NEGATIVE MG/DL
RBC # UR STRIP: ABNORMAL /UL
SP GR UR: 1 (ref 1–1.03)
UROBILINOGEN UR QL: NORMAL

## 2023-11-07 ENCOUNTER — OFFICE VISIT (OUTPATIENT)
Dept: OBSTETRICS AND GYNECOLOGY | Facility: CLINIC | Age: 33
End: 2023-11-07
Payer: COMMERCIAL

## 2023-11-07 DIAGNOSIS — N92.0 MENORRHAGIA WITH REGULAR CYCLE: Primary | ICD-10-CM

## 2023-11-07 NOTE — PROGRESS NOTES
3 months status post ablation.  Spoke to the patient via phone with verbal consent.  Patient doing well..  80% better.  Still 4 to 5 days but the first and last day only require a panty liner.  She is very happy with the results so far.  No pain and no fevers.  Recommend follow-up for yearly annual physical.    Troy Castro MD

## 2023-12-20 ENCOUNTER — APPOINTMENT (OUTPATIENT)
Dept: CT IMAGING | Facility: HOSPITAL | Age: 33
End: 2023-12-20
Payer: COMMERCIAL

## 2023-12-20 ENCOUNTER — HOSPITAL ENCOUNTER (EMERGENCY)
Facility: HOSPITAL | Age: 33
Discharge: HOME | End: 2023-12-20
Attending: EMERGENCY MEDICINE | Admitting: EMERGENCY MEDICINE
Payer: COMMERCIAL

## 2023-12-20 VITALS
WEIGHT: 200 LBS | DIASTOLIC BLOOD PRESSURE: 86 MMHG | BODY MASS INDEX: 37.76 KG/M2 | SYSTOLIC BLOOD PRESSURE: 120 MMHG | RESPIRATION RATE: 16 BRPM | OXYGEN SATURATION: 98 % | HEIGHT: 61 IN | TEMPERATURE: 100.1 F | HEART RATE: 109 BPM

## 2023-12-20 DIAGNOSIS — K52.9 GASTROENTERITIS: ICD-10-CM

## 2023-12-20 DIAGNOSIS — R10.84 GENERALIZED ABDOMINAL PAIN: Primary | ICD-10-CM

## 2023-12-20 DIAGNOSIS — R11.2 NAUSEA VOMITING AND DIARRHEA: ICD-10-CM

## 2023-12-20 DIAGNOSIS — R19.7 NAUSEA VOMITING AND DIARRHEA: ICD-10-CM

## 2023-12-20 LAB
ALBUMIN SERPL-MCNC: 3.9 G/DL (ref 3.5–5.2)
ALBUMIN/GLOB SERPL: 1.3 G/DL
ALP SERPL-CCNC: 108 U/L (ref 39–117)
ALT SERPL W P-5'-P-CCNC: 36 U/L (ref 1–33)
ANION GAP SERPL CALCULATED.3IONS-SCNC: 9.7 MMOL/L (ref 5–15)
AST SERPL-CCNC: 23 U/L (ref 1–32)
BACTERIA UR QL AUTO: ABNORMAL /HPF
BASOPHILS # BLD AUTO: 0.01 10*3/MM3 (ref 0–0.2)
BASOPHILS NFR BLD AUTO: 0.1 % (ref 0–1.5)
BILIRUB SERPL-MCNC: 0.6 MG/DL (ref 0–1.2)
BILIRUB UR QL STRIP: NEGATIVE
BUN SERPL-MCNC: 11 MG/DL (ref 6–20)
BUN/CREAT SERPL: 16.7 (ref 7–25)
CALCIUM SPEC-SCNC: 9.1 MG/DL (ref 8.6–10.5)
CHLORIDE SERPL-SCNC: 97 MMOL/L (ref 98–107)
CLARITY UR: CLEAR
CO2 SERPL-SCNC: 25.3 MMOL/L (ref 22–29)
COLOR UR: YELLOW
CREAT SERPL-MCNC: 0.66 MG/DL (ref 0.57–1)
DEPRECATED RDW RBC AUTO: 38.3 FL (ref 37–54)
EGFRCR SERPLBLD CKD-EPI 2021: 118.2 ML/MIN/1.73
EOSINOPHIL # BLD AUTO: 0 10*3/MM3 (ref 0–0.4)
EOSINOPHIL NFR BLD AUTO: 0 % (ref 0.3–6.2)
ERYTHROCYTE [DISTWIDTH] IN BLOOD BY AUTOMATED COUNT: 13.4 % (ref 12.3–15.4)
GLOBULIN UR ELPH-MCNC: 3 GM/DL
GLUCOSE SERPL-MCNC: 115 MG/DL (ref 65–99)
GLUCOSE UR STRIP-MCNC: NEGATIVE MG/DL
HCT VFR BLD AUTO: 40.5 % (ref 34–46.6)
HGB BLD-MCNC: 14 G/DL (ref 12–15.9)
HGB UR QL STRIP.AUTO: NEGATIVE
HOLD SPECIMEN: NORMAL
HOLD SPECIMEN: NORMAL
HYALINE CASTS UR QL AUTO: ABNORMAL /LPF
IMM GRANULOCYTES # BLD AUTO: 0.02 10*3/MM3 (ref 0–0.05)
IMM GRANULOCYTES NFR BLD AUTO: 0.2 % (ref 0–0.5)
KETONES UR QL STRIP: ABNORMAL
LEUKOCYTE ESTERASE UR QL STRIP.AUTO: NEGATIVE
LIPASE SERPL-CCNC: 11 U/L (ref 13–60)
LYMPHOCYTES # BLD AUTO: 0.43 10*3/MM3 (ref 0.7–3.1)
LYMPHOCYTES NFR BLD AUTO: 4.9 % (ref 19.6–45.3)
MCH RBC QN AUTO: 27.8 PG (ref 26.6–33)
MCHC RBC AUTO-ENTMCNC: 34.6 G/DL (ref 31.5–35.7)
MCV RBC AUTO: 80.4 FL (ref 79–97)
MONOCYTES # BLD AUTO: 0.42 10*3/MM3 (ref 0.1–0.9)
MONOCYTES NFR BLD AUTO: 4.8 % (ref 5–12)
NEUTROPHILS NFR BLD AUTO: 7.89 10*3/MM3 (ref 1.7–7)
NEUTROPHILS NFR BLD AUTO: 90 % (ref 42.7–76)
NITRITE UR QL STRIP: NEGATIVE
NRBC BLD AUTO-RTO: 0 /100 WBC (ref 0–0.2)
PH UR STRIP.AUTO: 7.5 [PH] (ref 4.5–8)
PLATELET # BLD AUTO: 277 10*3/MM3 (ref 140–450)
PMV BLD AUTO: 9.7 FL (ref 6–12)
POTASSIUM SERPL-SCNC: 3.7 MMOL/L (ref 3.5–5.2)
PROT SERPL-MCNC: 6.9 G/DL (ref 6–8.5)
PROT UR QL STRIP: ABNORMAL
RBC # BLD AUTO: 5.04 10*6/MM3 (ref 3.77–5.28)
RBC # UR STRIP: ABNORMAL /HPF
REF LAB TEST METHOD: ABNORMAL
SODIUM SERPL-SCNC: 132 MMOL/L (ref 136–145)
SP GR UR STRIP: 1.02 (ref 1–1.03)
SQUAMOUS #/AREA URNS HPF: ABNORMAL /HPF
UROBILINOGEN UR QL STRIP: ABNORMAL
WBC # UR STRIP: ABNORMAL /HPF
WBC NRBC COR # BLD AUTO: 8.77 10*3/MM3 (ref 3.4–10.8)
WHOLE BLOOD HOLD COAG: NORMAL
WHOLE BLOOD HOLD SPECIMEN: NORMAL

## 2023-12-20 PROCEDURE — 85025 COMPLETE CBC W/AUTO DIFF WBC: CPT | Performed by: EMERGENCY MEDICINE

## 2023-12-20 PROCEDURE — 74177 CT ABD & PELVIS W/CONTRAST: CPT

## 2023-12-20 PROCEDURE — 36415 COLL VENOUS BLD VENIPUNCTURE: CPT

## 2023-12-20 PROCEDURE — 96372 THER/PROPH/DIAG INJ SC/IM: CPT

## 2023-12-20 PROCEDURE — 25010000002 ONDANSETRON PER 1 MG: Performed by: EMERGENCY MEDICINE

## 2023-12-20 PROCEDURE — 99285 EMERGENCY DEPT VISIT HI MDM: CPT

## 2023-12-20 PROCEDURE — 25010000002 DICYCLOMINE PER 20 MG: Performed by: EMERGENCY MEDICINE

## 2023-12-20 PROCEDURE — 81001 URINALYSIS AUTO W/SCOPE: CPT | Performed by: EMERGENCY MEDICINE

## 2023-12-20 PROCEDURE — 25010000002 MORPHINE PER 10 MG: Performed by: EMERGENCY MEDICINE

## 2023-12-20 PROCEDURE — 83690 ASSAY OF LIPASE: CPT | Performed by: EMERGENCY MEDICINE

## 2023-12-20 PROCEDURE — 25810000003 LACTATED RINGERS SOLUTION: Performed by: EMERGENCY MEDICINE

## 2023-12-20 PROCEDURE — 96375 TX/PRO/DX INJ NEW DRUG ADDON: CPT

## 2023-12-20 PROCEDURE — 96374 THER/PROPH/DIAG INJ IV PUSH: CPT

## 2023-12-20 PROCEDURE — 80053 COMPREHEN METABOLIC PANEL: CPT | Performed by: EMERGENCY MEDICINE

## 2023-12-20 PROCEDURE — 25510000001 IOPAMIDOL PER 1 ML: Performed by: EMERGENCY MEDICINE

## 2023-12-20 RX ORDER — PROMETHAZINE HYDROCHLORIDE 25 MG/1
25 TABLET ORAL EVERY 8 HOURS PRN
Qty: 20 TABLET | Refills: 0 | Status: SHIPPED | OUTPATIENT
Start: 2023-12-20

## 2023-12-20 RX ORDER — ACETAMINOPHEN 500 MG
1000 TABLET ORAL ONCE
Status: COMPLETED | OUTPATIENT
Start: 2023-12-20 | End: 2023-12-20

## 2023-12-20 RX ORDER — SODIUM CHLORIDE 0.9 % (FLUSH) 0.9 %
10 SYRINGE (ML) INJECTION AS NEEDED
Status: DISCONTINUED | OUTPATIENT
Start: 2023-12-20 | End: 2023-12-20 | Stop reason: HOSPADM

## 2023-12-20 RX ORDER — ONDANSETRON 2 MG/ML
4 INJECTION INTRAMUSCULAR; INTRAVENOUS ONCE
Status: COMPLETED | OUTPATIENT
Start: 2023-12-20 | End: 2023-12-20

## 2023-12-20 RX ORDER — DICYCLOMINE HYDROCHLORIDE 10 MG/ML
20 INJECTION INTRAMUSCULAR ONCE
Status: COMPLETED | OUTPATIENT
Start: 2023-12-20 | End: 2023-12-20

## 2023-12-20 RX ORDER — ONDANSETRON 4 MG/1
4 TABLET, ORALLY DISINTEGRATING ORAL EVERY 8 HOURS PRN
Qty: 20 TABLET | Refills: 0 | Status: SHIPPED | OUTPATIENT
Start: 2023-12-20

## 2023-12-20 RX ORDER — VILAZODONE HYDROCHLORIDE 10 MG/1
10 TABLET ORAL DAILY
COMMUNITY
Start: 2023-11-20

## 2023-12-20 RX ADMIN — IOPAMIDOL 100 ML: 755 INJECTION, SOLUTION INTRAVENOUS at 08:25

## 2023-12-20 RX ADMIN — ACETAMINOPHEN 1000 MG: 500 TABLET ORAL at 09:21

## 2023-12-20 RX ADMIN — ONDANSETRON 4 MG: 2 INJECTION INTRAMUSCULAR; INTRAVENOUS at 07:34

## 2023-12-20 RX ADMIN — MORPHINE SULFATE 4 MG: 4 INJECTION INTRAVENOUS at 07:41

## 2023-12-20 RX ADMIN — DICYCLOMINE HYDROCHLORIDE 20 MG: 20 INJECTION, SOLUTION INTRAMUSCULAR at 07:33

## 2023-12-20 RX ADMIN — SODIUM CHLORIDE, POTASSIUM CHLORIDE, SODIUM LACTATE AND CALCIUM CHLORIDE 1000 ML: 600; 310; 30; 20 INJECTION, SOLUTION INTRAVENOUS at 07:25

## 2023-12-20 NOTE — ED NOTES
Unsuccessful attempt at blood draw by phlebotomist. IV fluid bolus infusing. Another clinician will attempt after some fluids have infused

## 2023-12-20 NOTE — ED NOTES
Pt unable to produce stool sample during ED visit today. Pt given collection hat and specimen cup to take home with collection instructions. States will collect specimen and bring to lab when able.

## 2023-12-20 NOTE — Clinical Note
CHARLEY CATES  [facility] EMERGENCY DEPARTMENT  [address] [place] [address]  Phone: [phone]    Denise Acosta was seen and treated in our emergency department on 12/20/2023.  She may return to work on 12/25/2023.         Thank you for choosing [facility].    Dennis Meng MD

## 2023-12-20 NOTE — ED PROVIDER NOTES
Subjective   History of Present Illness  Patient presents complaining of a 1 day history of nausea vomiting and diarrhea, patient is also having abdominal cramping.  Patient has a history of having C. difficile x 2.  Both times have been after antibiotics.  Patient's recently had a sinus infection for the past 10 days and called a telehealth doctor and they put her on ciprofloxacin.  Patient took this and is now having the GI issues.  Patient denies any blood in her vomit or stool.  No therapy taken prior to arrival.  Nothing seems to make it better or worse.      Review of Systems   All other systems reviewed and are negative.      Past Medical History:   Diagnosis Date    Anxiety     Clostridium difficile diarrhea 2018    Elevated cholesterol     GERD (gastroesophageal reflux disease)     IBS (irritable bowel syndrome)     Kidney stones     Migraines     PONV (postoperative nausea and vomiting)     Postural hypotension     Sepsis 04/2014       Allergies   Allergen Reactions    Sulfa Antibiotics Rash    Sulfasalazine Rash and Other (See Comments)    Sulfamethoxazole-Trimethoprim Other (See Comments)    Etodolac Hives     Annotation - [id number]: hives  Annotation - [id number]: hives  Annotation - 87Esc1960: hives      Hydrocodone-Acetaminophen Nausea And Vomiting    Methocarbamol Hives     Annotation - [id number]: hives  Annotation - 04Nov2019: hives  Annotation - [id number]: hives      Sumatriptan Other (See Comments)     Feels like having heart attack. Has chest pain and left arm heaviness       Past Surgical History:   Procedure Laterality Date    CHOLECYSTECTOMY      D & C HYSTEROSCOPY ENDOMETRIAL ABLATION N/A 7/17/2023    Procedure: HYSTEROSCOPY NOVASURE ENDOMETRIAL ABLATION;  Surgeon: Troy Castro MD;  Location: [facility];  Service: Obstetrics/Gynecology;  Laterality: N/A;    SALPINGECTOMY Bilateral 7/17/2023    Procedure: SALPINGECTOMY LAPAROSCOPIC;  Surgeon: Troy Castro MD;   Location: [facility];  Service: Obstetrics/Gynecology;  Laterality: Bilateral;    WISDOM TOOTH EXTRACTION         Family History   Problem Relation Age of Onset    Multiple myeloma Maternal Grandmother     Heart disease Maternal Grandfather     Diabetes Paternal Grandfather        Social History     Socioeconomic History    Marital status:    Tobacco Use    Smoking status: Never    Smokeless tobacco: Never   Vaping Use    Vaping Use: Never used   Substance and Sexual Activity    Alcohol use: No    Drug use: No    Sexual activity: Yes     Partners: Male     Comment:            Objective   Physical Exam  Vitals and nursing note reviewed.   Constitutional:       Appearance: Normal appearance.      Comments: Lying in bed, obvious discomfort, moaning, able to answer questions   HENT:      Head: Normocephalic.      Right Ear: External ear normal.      Left Ear: External ear normal.      Mouth/Throat:      Mouth: Mucous membranes are moist.      Pharynx: Oropharynx is clear.   Eyes:      Conjunctiva/sclera: Conjunctivae normal.   Cardiovascular:      Rate and Rhythm: Regular rhythm. Tachycardia present.      Pulses:           Radial pulses are 2+ on the right side and 2+ on the left side.      Heart sounds: Normal heart sounds, S1 normal and S2 normal.   Pulmonary:      Effort: Pulmonary effort is normal.      Breath sounds: Normal breath sounds.   Abdominal:      General: There is no distension.      Tenderness: There is no right CVA tenderness, left CVA tenderness or guarding.      Comments: Hyperactive b.s. in all 4 quads   Musculoskeletal:         General: No swelling.      Cervical back: Neck supple.   Skin:     General: Skin is warm and dry.      Capillary Refill: Capillary refill takes 2 to 3 seconds.   Neurological:      Mental Status: She is alert and oriented to person, place, and time.   Psychiatric:         Mood and Affect: Mood normal.         Behavior: Behavior normal.         Procedures            ED Course                                             Medical Decision Making  Ddx gastritis, enteritis, gastroenteritis, colitis, C. difficile, viral syndrome, food poisoning.    Labs Reviewed  COMPREHENSIVE METABOLIC PANEL - Abnormal; Notable for the following components:     Glucose                       115 (*)                Sodium                        132 (*)                Chloride                      97 (*)                 ALT (SGPT)                    36 (*)              All other components within normal limits         Narrative: GFR Normal >60                  Chronic Kidney Disease <60                  Kidney Failure <15                    LIPASE - Abnormal; Notable for the following components:     Lipase                        11 (*)              All other components within normal limits  URINALYSIS W/ MICROSCOPIC IF INDICATED (NO CULTURE) - Abnormal; Notable for the following components:     Ketones, UA                     (*)                  Protein, UA                     (*)               All other components within normal limits  CBC WITH AUTO DIFFERENTIAL - Abnormal; Notable for the following components:     Neutrophil %                  90.0 (*)               Lymphocyte %                  4.9 (*)                Monocyte %                    4.8 (*)                Eosinophil %                  0.0 (*)                Neutrophils, Absolute         7.89 (*)               Lymphocytes, Absolute         0.43 (*)            All other components within normal limits  URINALYSIS, MICROSCOPIC ONLY - Abnormal; Notable for the following components:     Bacteria, UA                  Trace (*)               Squamous Epithelial Cells, UA   3-6 (*)             All other components within normal limits  CLOSTRIDIOIDES DIFFICILE TOXIN         Narrative: The following orders were created for panel order Clostridioides difficile Toxin - Stool, Per Rectum.                  Procedure                                Abnormality         Status                                     ---------                               -----------         ------                                     Clostridioides difficile...[[id number]]                                                                                   Please view results for these tests on the individual orders.  CLOSTRIDIOIDES DIFFICILE EIA  RAINBOW DRAW         Narrative: The following orders were created for panel order George Draw.                  Procedure                               Abnormality         Status                                     ---------                               -----------         ------                                     Green Top (Gel)[[id number]]                                  In process                                 Lavender Top[[id number]]                                     In process                                 Gold Top - SST[[id number]]                                   In process                                 Light Blue Top[[id number]]                                   In process                                                   Please view results for these tests on the individual orders.  CBC AND DIFFERENTIAL         Narrative: The following orders were created for panel order CBC & Differential.                  Procedure                               Abnormality         Status                                     ---------                               -----------         ------                                     CBC Auto Differential[[id number]]        Abnormal            Final result                                                 Please view results for these tests on the individual orders.  GREEN TOP  LAVENDER TOP  GOLD TOP - SST  LIGHT BLUE TOP    CT Abdomen Pelvis With Contrast    Result Date: 12/20/2023   No acute abnormality of the abdomen or pelvis.    This report was finalized on 12/20/2023 8:42 AM by Zackary Peterson  MD.      0161 Pt seen again prior to d/c.  Labs/Imaging reviewed and are unremarkable for any elevation in wbc or gi inflammation on CT.  Vitals stable and pt. in NAD. Non-toxic.  Relaxed breathing.  All questions personally answered at the bedside and all d/c instructions personally reviewed with pt.  Discussed the importance of close outpt. f/u and pt. understands this and agrees to do so.  Pt agrees to return to ED immediately for any new, persistent, or worsening symptoms.  Patient spouse was present for the entire evaluation and discharge instructions.    EMR Dragon/Transcription disclaimer:  Much of this encounter note is an electronic transcription/translation of spoken language to printed text, aka voice recognition.  The electronic translation of spoken language may permit erroneous or at times nonsensical words or phrases to be inadvertently transcribed; although I have reviewed the note for such errors, some may still exist so please interpret based on surrounding text content.      Amount and/or Complexity of Data Reviewed  Labs: ordered.    Risk  Prescription drug management.        Final diagnoses:   Generalized abdominal pain   Nausea vomiting and diarrhea   Gastroenteritis       ED Disposition  ED Disposition       ED Disposition   Discharge    Condition   Stable    Comment   --               Maria Isabel Easton, PA  [address] [facility] [place] [id number] [phone]    In 2 days  If symptoms worsen    Iván Valera MD  [address] [facility]  GASTROENTEROLOGY  [facility] [address] [phone]    In 2 days  If symptoms worsen         Medication List        New Prescriptions      ondansetron ODT 4 MG disintegrating tablet  Commonly known as: ZOFRAN-ODT  Place 1 tablet on the tongue Every 8 (Eight) Hours As Needed for Nausea or Vomiting.     promethazine 25 MG tablet  Commonly known as: PHENERGAN  Take 1 tablet by mouth Every 8 (Eight) Hours As Needed for Nausea or Vomiting.                Where to Get Your Medications        These medications were sent to [facility] [id number] - [place] - [address] [facility] - [phone] [facility] [phone]   [address], [place] [address]      Phone: [phone]   ondansetron ODT 4 MG disintegrating tablet  promethazine 25 MG tablet            Dennis Meng MD  12/20/23 2120

## 2023-12-20 NOTE — Clinical Note
CHARLEY CATES  [facility] EMERGENCY DEPARTMENT  [address] [place] [address]  Phone: [phone]    Denise Acosta was seen and treated in our emergency department on 12/20/2023.  She may return to work on 12/25/2023.         Thank you for choosing [facility].    Dennis Meng MD

## 2023-12-21 ENCOUNTER — LAB (OUTPATIENT)
Dept: LAB | Facility: HOSPITAL | Age: 33
End: 2023-12-21
Payer: COMMERCIAL

## 2023-12-21 ENCOUNTER — TRANSCRIBE ORDERS (OUTPATIENT)
Dept: ADMINISTRATIVE | Facility: HOSPITAL | Age: 33
End: 2023-12-21
Payer: COMMERCIAL

## 2023-12-21 DIAGNOSIS — R10.9 ABDOMINAL PAIN, UNSPECIFIED ABDOMINAL LOCATION: ICD-10-CM

## 2023-12-21 DIAGNOSIS — R19.7 DIARRHEA, UNSPECIFIED TYPE: ICD-10-CM

## 2023-12-21 DIAGNOSIS — R19.7 DIARRHEA, UNSPECIFIED TYPE: Primary | ICD-10-CM

## 2023-12-21 LAB — C DIFF TOX GENS STL QL NAA+PROBE: NEGATIVE

## 2023-12-21 PROCEDURE — 87493 C DIFF AMPLIFIED PROBE: CPT

## 2024-07-20 ENCOUNTER — HOSPITAL ENCOUNTER (EMERGENCY)
Facility: HOSPITAL | Age: 34
Discharge: HOME | End: 2024-07-20
Attending: EMERGENCY MEDICINE
Payer: COMMERCIAL

## 2024-07-20 ENCOUNTER — APPOINTMENT (OUTPATIENT)
Dept: CT IMAGING | Facility: HOSPITAL | Age: 34
End: 2024-07-20
Payer: COMMERCIAL

## 2024-07-20 VITALS
BODY MASS INDEX: 36.82 KG/M2 | HEART RATE: 74 BPM | WEIGHT: 195 LBS | HEIGHT: 61 IN | SYSTOLIC BLOOD PRESSURE: 110 MMHG | TEMPERATURE: 98.6 F | OXYGEN SATURATION: 97 % | RESPIRATION RATE: 16 BRPM | DIASTOLIC BLOOD PRESSURE: 70 MMHG

## 2024-07-20 DIAGNOSIS — N20.1 LEFT URETERAL STONE: Primary | ICD-10-CM

## 2024-07-20 LAB
ALBUMIN SERPL-MCNC: 4.3 G/DL (ref 3.5–5.2)
ALBUMIN/GLOB SERPL: 1.3 G/DL
ALP SERPL-CCNC: 127 U/L (ref 39–117)
ALT SERPL W P-5'-P-CCNC: 33 U/L (ref 1–33)
ANION GAP SERPL CALCULATED.3IONS-SCNC: 13.7 MMOL/L (ref 5–15)
AST SERPL-CCNC: 27 U/L (ref 1–32)
BACTERIA UR QL AUTO: ABNORMAL /HPF
BASOPHILS # BLD AUTO: 0.04 10*3/MM3 (ref 0–0.2)
BASOPHILS NFR BLD AUTO: 0.4 % (ref 0–1.5)
BILIRUB SERPL-MCNC: 0.2 MG/DL (ref 0–1.2)
BILIRUB UR QL STRIP: NEGATIVE
BUN SERPL-MCNC: 10 MG/DL (ref 6–20)
BUN/CREAT SERPL: 13.5 (ref 7–25)
CALCIUM SPEC-SCNC: 9.5 MG/DL (ref 8.6–10.5)
CHLORIDE SERPL-SCNC: 101 MMOL/L (ref 98–107)
CLARITY UR: ABNORMAL
CO2 SERPL-SCNC: 24.3 MMOL/L (ref 22–29)
COLOR UR: ABNORMAL
CREAT SERPL-MCNC: 0.74 MG/DL (ref 0.57–1)
DEPRECATED RDW RBC AUTO: 37.5 FL (ref 37–54)
EGFRCR SERPLBLD CKD-EPI 2021: 108.4 ML/MIN/1.73
EOSINOPHIL # BLD AUTO: 0.05 10*3/MM3 (ref 0–0.4)
EOSINOPHIL NFR BLD AUTO: 0.5 % (ref 0.3–6.2)
ERYTHROCYTE [DISTWIDTH] IN BLOOD BY AUTOMATED COUNT: 12.9 % (ref 12.3–15.4)
GLOBULIN UR ELPH-MCNC: 3.2 GM/DL
GLUCOSE SERPL-MCNC: 123 MG/DL (ref 65–99)
GLUCOSE UR STRIP-MCNC: NEGATIVE MG/DL
HCT VFR BLD AUTO: 40.2 % (ref 34–46.6)
HGB BLD-MCNC: 13.7 G/DL (ref 12–15.9)
HGB UR QL STRIP.AUTO: ABNORMAL
HYALINE CASTS UR QL AUTO: ABNORMAL /LPF
IMM GRANULOCYTES # BLD AUTO: 0.05 10*3/MM3 (ref 0–0.05)
IMM GRANULOCYTES NFR BLD AUTO: 0.5 % (ref 0–0.5)
KETONES UR QL STRIP: ABNORMAL
LEUKOCYTE ESTERASE UR QL STRIP.AUTO: NEGATIVE
LYMPHOCYTES # BLD AUTO: 3.17 10*3/MM3 (ref 0.7–3.1)
LYMPHOCYTES NFR BLD AUTO: 28.7 % (ref 19.6–45.3)
MCH RBC QN AUTO: 27.7 PG (ref 26.6–33)
MCHC RBC AUTO-ENTMCNC: 34.1 G/DL (ref 31.5–35.7)
MCV RBC AUTO: 81.2 FL (ref 79–97)
MONOCYTES # BLD AUTO: 0.66 10*3/MM3 (ref 0.1–0.9)
MONOCYTES NFR BLD AUTO: 6 % (ref 5–12)
NEUTROPHILS NFR BLD AUTO: 63.9 % (ref 42.7–76)
NEUTROPHILS NFR BLD AUTO: 7.08 10*3/MM3 (ref 1.7–7)
NITRITE UR QL STRIP: NEGATIVE
NRBC BLD AUTO-RTO: 0 /100 WBC (ref 0–0.2)
PH UR STRIP.AUTO: 5.5 [PH] (ref 4.5–8)
PLATELET # BLD AUTO: 294 10*3/MM3 (ref 140–450)
PMV BLD AUTO: 10.5 FL (ref 6–12)
POTASSIUM SERPL-SCNC: 3.5 MMOL/L (ref 3.5–5.2)
PROT SERPL-MCNC: 7.5 G/DL (ref 6–8.5)
PROT UR QL STRIP: ABNORMAL
RBC # BLD AUTO: 4.95 10*6/MM3 (ref 3.77–5.28)
RBC # UR STRIP: ABNORMAL /HPF
REF LAB TEST METHOD: ABNORMAL
SODIUM SERPL-SCNC: 139 MMOL/L (ref 136–145)
SP GR UR STRIP: 1.03 (ref 1–1.03)
SQUAMOUS #/AREA URNS HPF: ABNORMAL /HPF
UROBILINOGEN UR QL STRIP: ABNORMAL
WBC # UR STRIP: ABNORMAL /HPF
WBC NRBC COR # BLD AUTO: 11.05 10*3/MM3 (ref 3.4–10.8)

## 2024-07-20 PROCEDURE — 25010000002 KETOROLAC TROMETHAMINE PER 15 MG

## 2024-07-20 PROCEDURE — 81001 URINALYSIS AUTO W/SCOPE: CPT | Performed by: EMERGENCY MEDICINE

## 2024-07-20 PROCEDURE — 99284 EMERGENCY DEPT VISIT MOD MDM: CPT

## 2024-07-20 PROCEDURE — 25010000002 ONDANSETRON PER 1 MG: Performed by: EMERGENCY MEDICINE

## 2024-07-20 PROCEDURE — 85025 COMPLETE CBC W/AUTO DIFF WBC: CPT | Performed by: EMERGENCY MEDICINE

## 2024-07-20 PROCEDURE — 80053 COMPREHEN METABOLIC PANEL: CPT | Performed by: EMERGENCY MEDICINE

## 2024-07-20 PROCEDURE — 96374 THER/PROPH/DIAG INJ IV PUSH: CPT

## 2024-07-20 PROCEDURE — 74176 CT ABD & PELVIS W/O CONTRAST: CPT

## 2024-07-20 PROCEDURE — 96375 TX/PRO/DX INJ NEW DRUG ADDON: CPT

## 2024-07-20 RX ORDER — KETOROLAC TROMETHAMINE 30 MG/ML
INJECTION, SOLUTION INTRAMUSCULAR; INTRAVENOUS
Status: COMPLETED
Start: 2024-07-20 | End: 2024-07-20

## 2024-07-20 RX ORDER — TAMSULOSIN HYDROCHLORIDE 0.4 MG/1
0.4 CAPSULE ORAL ONCE
Status: COMPLETED | OUTPATIENT
Start: 2024-07-20 | End: 2024-07-20

## 2024-07-20 RX ORDER — TAMSULOSIN HYDROCHLORIDE 0.4 MG/1
1 CAPSULE ORAL DAILY
Qty: 30 CAPSULE | Refills: 0 | Status: SHIPPED | OUTPATIENT
Start: 2024-07-20

## 2024-07-20 RX ORDER — KETOROLAC TROMETHAMINE 30 MG/ML
15 INJECTION, SOLUTION INTRAMUSCULAR; INTRAVENOUS ONCE
Status: COMPLETED | OUTPATIENT
Start: 2024-07-20 | End: 2024-07-20

## 2024-07-20 RX ORDER — KETOROLAC TROMETHAMINE 10 MG/1
10 TABLET, FILM COATED ORAL EVERY 6 HOURS PRN
Qty: 20 TABLET | Refills: 0 | Status: SHIPPED | OUTPATIENT
Start: 2024-07-20

## 2024-07-20 RX ORDER — ONDANSETRON 2 MG/ML
8 INJECTION INTRAMUSCULAR; INTRAVENOUS ONCE
Status: COMPLETED | OUTPATIENT
Start: 2024-07-20 | End: 2024-07-20

## 2024-07-20 RX ORDER — ONDANSETRON HYDROCHLORIDE 8 MG/1
8 TABLET, FILM COATED ORAL EVERY 8 HOURS PRN
Qty: 10 TABLET | Refills: 0 | Status: SHIPPED | OUTPATIENT
Start: 2024-07-20

## 2024-07-20 RX ADMIN — KETOROLAC TROMETHAMINE 15 MG: 30 INJECTION, SOLUTION INTRAMUSCULAR; INTRAVENOUS at 15:23

## 2024-07-20 RX ADMIN — ONDANSETRON 8 MG: 2 INJECTION INTRAMUSCULAR; INTRAVENOUS at 16:25

## 2024-07-20 RX ADMIN — TAMSULOSIN HYDROCHLORIDE 0.4 MG: 0.4 CAPSULE ORAL at 16:25

## 2024-07-20 NOTE — ED PROVIDER NOTES
Subjective   History of Present Illness    Chief complaint: Flank pain    Location: Left    Quality/Severity: Moderate    Timing/Onset/Duration: 1 hour ago    Modifying Factors: Nothing makes it better    Associated Symptoms: No headache.  No fever chills or cough.  No sore throat earache or nasal congestion.  No chest pain or shortness of breath.  Patient has nausea but no vomiting.  No diarrhea or burning on urination.    Narrative: This 34-year-old with a history of kidney stones in the past, presents with acute onset of left flank pain.    PCP:Maria Isabel Easton PA    Urology: James      Review of Systems   Constitutional:  Negative for chills.   HENT:  Negative for congestion, ear pain and sore throat.    Respiratory:  Negative for cough and shortness of breath.    Cardiovascular:  Negative for chest pain.   Gastrointestinal:  Positive for nausea. Negative for vomiting.   Genitourinary:  Positive for flank pain (Left).   Skin:  Negative for rash.   Neurological:  Negative for headaches.         Past Medical History:   Diagnosis Date    Anxiety     Clostridium difficile diarrhea 2018    Elevated cholesterol     GERD (gastroesophageal reflux disease)     IBS (irritable bowel syndrome)     Kidney stones     Migraines     PONV (postoperative nausea and vomiting)     Postural hypotension     Sepsis 04/2014       Allergies   Allergen Reactions    Sulfa Antibiotics Rash    Sulfasalazine Rash and Other (See Comments)    Sulfamethoxazole-Trimethoprim Other (See Comments)    Etodolac Hives     Annotation - 04Nov2019: hives  Annotation - 04Nov2019: hives  Annotation - 04Nov2019: hives      Hydrocodone-Acetaminophen Nausea And Vomiting    Methocarbamol Hives     Annotation - 04Nov2019: hives  Annotation - 04Nov2019: hives  Annotation - 04Nov2019: hives      Sumatriptan Other (See Comments)     Feels like having heart attack. Has chest pain and left arm heaviness       Past Surgical History:   Procedure Laterality Date     CHOLECYSTECTOMY      D & C HYSTEROSCOPY ENDOMETRIAL ABLATION N/A 7/17/2023    Procedure: HYSTEROSCOPY NOVASURE ENDOMETRIAL ABLATION;  Surgeon: Troy Castro MD;  Location: [facility] OR;  Service: Obstetrics/Gynecology;  Laterality: N/A;    SALPINGECTOMY Bilateral 7/17/2023    Procedure: SALPINGECTOMY LAPAROSCOPIC;  Surgeon: Troy Castro MD;  Location: [facility] OR;  Service: Obstetrics/Gynecology;  Laterality: Bilateral;    WISDOM TOOTH EXTRACTION         Family History   Problem Relation Age of Onset    Multiple myeloma Maternal Grandmother     Heart disease Maternal Grandfather     Diabetes Paternal Grandfather        Social History     Socioeconomic History    Marital status:    Tobacco Use    Smoking status: Never    Smokeless tobacco: Never   Vaping Use    Vaping status: Never Used   Substance and Sexual Activity    Alcohol use: No    Drug use: No    Sexual activity: Yes     Partners: Male     Comment:            Objective   Physical Exam  Vitals (The temperature is 98.6 °F, pulse 81, respirations 22, /74, room air pulse ox 99%.) and nursing note reviewed.   Constitutional:       Comments: Patient appears to be uncomfortable   HENT:      Head: Normocephalic and atraumatic.   Cardiovascular:      Rate and Rhythm: Normal rate and regular rhythm.      Pulses: Normal pulses.      Heart sounds: Normal heart sounds. No murmur heard.     No friction rub. No gallop.   Pulmonary:      Effort: Pulmonary effort is normal.      Breath sounds: Normal breath sounds.   Abdominal:      General: Abdomen is flat. Bowel sounds are normal. There is no distension.      Palpations: Abdomen is soft. There is no mass.      Tenderness: There is no abdominal tenderness. There is no right CVA tenderness, left CVA tenderness, guarding or rebound.      Hernia: No hernia is present.   Musculoskeletal:         General: No swelling. Normal range of motion.   Skin:     General: Skin is warm and dry.       Capillary Refill: Capillary refill takes less than 2 seconds.      Coloration: Skin is pale.      Findings: No rash.   Neurological:      General: No focal deficit present.      Mental Status: She is alert and oriented to person, place, and time.      Sensory: No sensory deficit.      Motor: No weakness.         Procedures           ED Course  ED Course as of 07/20/24 1612   Sat Jul 20, 2024   1527 The white blood cell count is 11, the CBC is otherwise unremarkable. [RC]   1530 The urinalysis shows trace ketones, large blood, protein 100 mg/dL, leukocytes negative, nitrite negative.  The urine microscopic shows too numerous to count RBCs, no WBCs, no bacteria, 0-2 squamous epithelial cells.  The nitrite is negative.  Leukocyte esterase is negative.  The urinalysis is otherwise unremarkable. [RC]   1611 The serum glucose is 123, the alk phos is 127, the GFR is 108, the CMP is otherwise unremarkable. [RC]      ED Course User Index  [RC] Jasvir Randolph MD      16:12 EDT, 07/20/24:  The patient was reassessed.  She feels much better.  Her vital signs were reviewed and are stable.  Abdominal exam: Soft, nontender, no masses, positive bowel sounds.    16:15 EDT, 07/20/24:  Patient's diagnosis of left ureteral stone was discussed with her.  The patient will be given a prescription for ketorolac, and Zofran.  The patient will also be given a prescription for Flomax.  We will give her a dose of Flomax here in the emergency department.  The patient should strain her urine.  If she passes a stone, she should place that in a specimen cup and take it with her to Dr. Ramirez's office.  The patient should call Dr. Ramirez on Monday for follow-up appointment within 1 week.  The patient should return to the emergency department there is fever, vomiting not controlled with Zofran, pain not controlled with ketorolac, worse in any way at all.  All the patient's questions were answered she will be discharged in good condition.                                        Medical Decision Making  Amount and/or Complexity of Data Reviewed  Labs: ordered.        Final diagnoses:   Left ureteral stone       ED Disposition  ED Disposition       None            No follow-up provider specified.       Medication List      No changes were made to your prescriptions during this visit.       No orders to display     Labs Reviewed   COMPREHENSIVE METABOLIC PANEL   URINALYSIS W/ MICROSCOPIC IF INDICATED (NO CULTURE)   CBC WITH AUTO DIFFERENTIAL   CBC AND DIFFERENTIAL    Narrative:     The following orders were created for panel order CBC & Differential.  Procedure                               Abnormality         Status                     ---------                               -----------         ------                     CBC Auto Differential[[id number]]                                                         Please view results for these tests on the individual orders.     No results found.    Final diagnoses:   None         ED Medications:  Medications - No data to display    New Medications:     Medication List        ASK your doctor about these medications      atorvastatin 80 MG tablet  Commonly known as: LIPITOR     cyanocobalamin 1000 MCG/ML injection     dicyclomine 10 MG capsule  Commonly known as: BENTYL     famotidine 20 MG tablet  Commonly known as: PEPCID     HYDROcodone-acetaminophen 5-325 MG per tablet  Commonly known as: Norco  Take 1 tablet by mouth Every 6 (Six) Hours As Needed for Severe Pain.     hydrOXYzine 25 MG tablet  Commonly known as: ATARAX     naproxen 500 MG tablet  Commonly known as: NAPROSYN  Take 1 tablet by mouth 2 (Two) Times a Day With Meals.     NON FORMULARY     Nurtec 75 MG dispersible tablet  Generic drug: Rimegepant Sulfate     ondansetron ODT 4 MG disintegrating tablet  Commonly known as: ZOFRAN-ODT  Place 1 tablet on the tongue Every 8 (Eight) Hours As Needed for Nausea or Vomiting.     PROBIOTIC ACIDOPHILUS PO      promethazine 25 MG tablet  Commonly known as: PHENERGAN  Take 1 tablet by mouth Every 8 (Eight) Hours As Needed for Nausea or Vomiting.     vilazodone 10 MG tablet tablet  Commonly known as: VIIBRYD              Stopped Medications:     Medication List        ASK your doctor about these medications      atorvastatin 80 MG tablet  Commonly known as: LIPITOR     cyanocobalamin 1000 MCG/ML injection     dicyclomine 10 MG capsule  Commonly known as: BENTYL     famotidine 20 MG tablet  Commonly known as: PEPCID     HYDROcodone-acetaminophen 5-325 MG per tablet  Commonly known as: Norco  Take 1 tablet by mouth Every 6 (Six) Hours As Needed for Severe Pain.     hydrOXYzine 25 MG tablet  Commonly known as: ATARAX     naproxen 500 MG tablet  Commonly known as: NAPROSYN  Take 1 tablet by mouth 2 (Two) Times a Day With Meals.     NON FORMULARY     Nurtec 75 MG dispersible tablet  Generic drug: Rimegepant Sulfate     ondansetron ODT 4 MG disintegrating tablet  Commonly known as: ZOFRAN-ODT  Place 1 tablet on the tongue Every 8 (Eight) Hours As Needed for Nausea or Vomiting.     PROBIOTIC ACIDOPHILUS PO     promethazine 25 MG tablet  Commonly known as: PHENERGAN  Take 1 tablet by mouth Every 8 (Eight) Hours As Needed for Nausea or Vomiting.     vilazodone 10 MG tablet tablet  Commonly known as: Jasvir Vincent MD  07/20/24 2568

## 2024-07-20 NOTE — DISCHARGE INSTRUCTIONS
Strain your urine.  If you passed a stone, place it in a specimen cup, and take that with you to Dr. Ramirez's office.  Call Dr. Ramirez Monday morning for follow-up appointment within 1 week.  Return to emergency department if there is pain not controlled with ketorolac, vomiting not controlled with Zofran, fever, worse in any way at all.

## 2024-08-28 ENCOUNTER — PRE-ADMISSION TESTING (OUTPATIENT)
Dept: PREADMISSION TESTING | Facility: HOSPITAL | Age: 34
End: 2024-08-28
Payer: COMMERCIAL

## 2024-08-28 VITALS
SYSTOLIC BLOOD PRESSURE: 118 MMHG | OXYGEN SATURATION: 97 % | BODY MASS INDEX: 40.44 KG/M2 | HEART RATE: 88 BPM | HEIGHT: 60 IN | DIASTOLIC BLOOD PRESSURE: 75 MMHG | RESPIRATION RATE: 18 BRPM | TEMPERATURE: 98.4 F | WEIGHT: 206 LBS

## 2024-08-28 LAB
ANION GAP SERPL CALCULATED.3IONS-SCNC: 12 MMOL/L (ref 5–15)
BUN SERPL-MCNC: 8 MG/DL (ref 6–20)
BUN/CREAT SERPL: 10.5 (ref 7–25)
CALCIUM SPEC-SCNC: 9.4 MG/DL (ref 8.6–10.5)
CHLORIDE SERPL-SCNC: 102 MMOL/L (ref 98–107)
CO2 SERPL-SCNC: 23 MMOL/L (ref 22–29)
CREAT SERPL-MCNC: 0.76 MG/DL (ref 0.57–1)
DEPRECATED RDW RBC AUTO: 38.2 FL (ref 37–54)
EGFRCR SERPLBLD CKD-EPI 2021: 104.9 ML/MIN/1.73
ERYTHROCYTE [DISTWIDTH] IN BLOOD BY AUTOMATED COUNT: 13.1 % (ref 12.3–15.4)
GLUCOSE SERPL-MCNC: 115 MG/DL (ref 65–99)
HCG SERPL QL: NEGATIVE
HCT VFR BLD AUTO: 40.7 % (ref 34–46.6)
HGB BLD-MCNC: 13.6 G/DL (ref 12–15.9)
MCH RBC QN AUTO: 27 PG (ref 26.6–33)
MCHC RBC AUTO-ENTMCNC: 33.4 G/DL (ref 31.5–35.7)
MCV RBC AUTO: 80.9 FL (ref 79–97)
PLATELET # BLD AUTO: 312 10*3/MM3 (ref 140–450)
PMV BLD AUTO: 10.6 FL (ref 6–12)
POTASSIUM SERPL-SCNC: 3.6 MMOL/L (ref 3.5–5.2)
QT INTERVAL: 396 MS
QTC INTERVAL: 452 MS
RBC # BLD AUTO: 5.03 10*6/MM3 (ref 3.77–5.28)
SODIUM SERPL-SCNC: 137 MMOL/L (ref 136–145)
WBC NRBC COR # BLD AUTO: 9.07 10*3/MM3 (ref 3.4–10.8)

## 2024-08-28 PROCEDURE — 85027 COMPLETE CBC AUTOMATED: CPT

## 2024-08-28 PROCEDURE — 36415 COLL VENOUS BLD VENIPUNCTURE: CPT

## 2024-08-28 PROCEDURE — 84703 CHORIONIC GONADOTROPIN ASSAY: CPT

## 2024-08-28 PROCEDURE — 80048 BASIC METABOLIC PNL TOTAL CA: CPT

## 2024-08-28 PROCEDURE — 93005 ELECTROCARDIOGRAM TRACING: CPT

## 2024-08-28 PROCEDURE — 93010 ELECTROCARDIOGRAM REPORT: CPT | Performed by: INTERNAL MEDICINE

## 2024-08-28 NOTE — DISCHARGE INSTRUCTIONS
Take the following medications the morning of surgery with a small sip of water:  FAMOTIDINE, VILAZODONE    If you are on prescription narcotic pain medication to control your pain you may also take that medication the morning of surgery.    General Instructions:  Do not eat or drink anything after 6:00 AM before surgery.  Infants may have breast milk up to four hours before surgery.  Infants drinking formula may drink formula up to six hours before surgery.   Patients who avoid smoking, chewing tobacco and alcohol for 4 weeks prior to surgery have a reduced risk of post-operative complications.  Quit smoking as many days before surgery as you can.  Do not smoke, use chewing tobacco or drink alcohol the day of surgery.   If applicable bring your C-PAP/ BI-PAP machine in with you to preop day of surgery.  Bring any papers given to you in the doctor’s office.  Wear clean comfortable clothes.  Do not wear contact lenses, false eyelashes or make-up.  Bring a case for your glasses.   Bring crutches or walker if applicable.  Remove all piercings.  Leave jewelry and any other valuables at home.  Hair extensions with metal clips must be removed prior to surgery.  The Pre-Admission Testing nurse will instruct you to bring medications if unable to obtain an accurate list in Pre-Admission Testing.        If you were given a blood bank ID arm band remember to bring it with you the day of surgery.    Preventing a Surgical Site Infection:  For 2 to 3 days before surgery, avoid shaving with a razor because the razor can irritate skin and make it easier to develop an infection.    Any areas of open skin can increase the risk of a post-operative wound infection by allowing bacteria to enter and travel throughout the body.  Notify your surgeon if you have any skin wounds / rashes even if it is not near the expected surgical site.  The area will need assessed to determine if surgery should be delayed until it is healed.  The night  prior to surgery shower using a fresh bar of anti-bacterial soap (such as Dial) and clean washcloth.  Sleep in a clean bed with clean clothing.  Do not allow pets to sleep with you.  Shower on the morning of surgery using a fresh bar of anti-bacterial soap (such as Dial) and clean washcloth.  Dry with a clean towel and dress in clean clothing.  Ask your surgeon if you will be receiving antibiotics prior to surgery.  Make sure you, your family, and all healthcare providers clean their hands with soap and water or an alcohol based hand  before caring for you or your wound.    Day of surgery:  Your arrival time is approximately two hours before your scheduled surgery time.  Please note if you have an early arrival time the surgery doors do not open before 5:00 AM.  Upon arrival, a Pre-op nurse and Anesthesiologist will review your health history, obtain vital signs, and answer questions you may have.  The only belongings needed at this time will be your home medications and if applicable your C-PAP/BI-PAP machine.  A Pre-op nurse will start an IV and you may receive medication in preparation for surgery, including something to help you relax.      Please be aware that surgery does come with discomfort.  We want to make every effort to control your discomfort so please discuss any uncontrolled symptoms with your nurse.   Your doctor will most likely have prescribed pain medications.      If you are going home after surgery you will receive individualized written care instructions before being discharged.  A responsible adult must drive you to and from the hospital on the day of your surgery and ideally stay with you through the night.  Discharge prescriptions can be filled by the hospital pharmacy during regular pharmacy hours.  If you are having surgery late in the day/evening your prescription may be e-prescribed to your pharmacy.  Please verify your pharmacy hours or chose a 24 hour pharmacy to avoid not  having access to your prescription because your pharmacy has closed for the day.    If you are staying overnight following surgery, you will be transported to your hospital room following the recovery period.  [facility] has all private rooms.    If you have any questions please call Pre-Admission Testing at [phone].  Deductibles and co-payments are collected on the day of service. Please be prepared to pay the required co-pay, deductible or deposit on the day of service as defined by your plan.    Call your surgeon immediately if you experience any of the following symptoms:  Sore Throat  Shortness of Breath or difficulty breathing  Cough  Chills  Body soreness or muscle pain  Headache  Fever  New loss of taste or smell  Do not arrive for your surgery ill.  Your procedure will need to be rescheduled to another time.  You will need to call your physician before the day of surgery to avoid any unnecessary exposure to hospital staff as well as other patients.

## 2024-08-29 NOTE — H&P
[facility] History and Physical    Patient Care Team:  Maria Isabel Easton PA as PCP - General (Physician Assistant)    Chief complaint kidney stone left side    Subjective     Patient is a 34 y.o. female presents with history of stone disease left renal colic with a 4 mm left distal ureteral stone inability to pass with hydronephrosis left nephro calculi no fever and chills.       Review of Systems   Pertinent items are noted in HPI    Past Medical History:   Diagnosis Date    Anxiety     Blood in urine     Clostridium difficile diarrhea 2018    Elevated cholesterol     GERD (gastroesophageal reflux disease)     History of Clostridioides difficile colitis     IBS (irritable bowel syndrome)     Kidney stones     Migraines     PONV (postoperative nausea and vomiting)     Postural hypotension     Sepsis 04/2014    Tachycardia     PERIODS OF TACHYCARDIA WITH HOLTER MONITOR- MOST LIKELY RELATED TO KIDNEY STONES. APPOINTMENT WITH DR. SCOTT 9-16-24.     Past Surgical History:   Procedure Laterality Date    CHOLECYSTECTOMY      D & C HYSTEROSCOPY ENDOMETRIAL ABLATION N/A 7/17/2023    Procedure: HYSTEROSCOPY NOVASURE ENDOMETRIAL ABLATION;  Surgeon: Troy Castro MD;  Location: [facility];  Service: Obstetrics/Gynecology;  Laterality: N/A;    SALPINGECTOMY Bilateral 7/17/2023    Procedure: SALPINGECTOMY LAPAROSCOPIC;  Surgeon: Troy Castro MD;  Location: [facility] OR;  Service: Obstetrics/Gynecology;  Laterality: Bilateral;    WISDOM TOOTH EXTRACTION       Family History   Problem Relation Age of Onset    Multiple myeloma Maternal Grandmother     Heart disease Maternal Grandfather     Diabetes Paternal Grandfather     Malig Hyperthermia Neg Hx      Social History     Tobacco Use    Smoking status: Never    Smokeless tobacco: Never   Vaping Use    Vaping status: Never Used   Substance Use Topics    Alcohol use: No    Drug use: No       Meds:  No medications prior to admission.        Allergies:  Sulfa antibiotics, Sulfasalazine, Sulfamethoxazole-trimethoprim, Etodolac, Hydrocodone-acetaminophen, Methocarbamol, and Sumatriptan    Debilities:      Objective     Vital Signs  Temp:  [98.4 °F (36.9 °C)] 98.4 °F (36.9 °C)  Heart Rate:  [88] 88  Resp:  [18] 18  BP: (118)/(75) 118/75  No intake or output data in the 24 hours ending 08/29/24 0752       Physical Exam:      General Appearance:  Alert, cooperative, in no acute distress   Head:  Normocephalic, without obvious abnormality, atraumatic   Eyes:  Lids and lashes normal, conjunctivae and sclerae normal, no icterus, no pallor, corneas clear   Ears:  Ears appear intact with no abnormalities noted   Throat:     Neck:  No adenopathy, supple, trachea midline, no thyromegaly, no JVD   Back:  No kyphosis present, no scoliosis present, no skin lesions, erythema or scars, no tenderness to percussion, or palpation, range of motion normal   Lungs:  respirations regular, even and unlabored    Heart:  Regular rhythm and normal rate   Breast Exam:  Deferred   Abdomen:  no masses, no organomegaly, soft non-tender, non-distended, no guarding, no rebound tenderness   Genitalia:  Deferred   Extremities:     Pulses:     Skin:     Lymph nodes:     Neurologic:       Results Review:    I reviewed the patient's new clinical results.  Results for orders placed or performed in visit on 08/28/24   CBC (No Diff)    Specimen: Blood   Result Value Ref Range    WBC 9.07 3.40 - 10.80 10*3/mm3    RBC 5.03 3.77 - 5.28 10*6/mm3    Hemoglobin 13.6 12.0 - 15.9 g/dL    Hematocrit 40.7 34.0 - 46.6 %    MCV 80.9 79.0 - 97.0 fL    MCH 27.0 26.6 - 33.0 pg    MCHC 33.4 31.5 - 35.7 g/dL    RDW 13.1 12.3 - 15.4 %    RDW-SD 38.2 37.0 - 54.0 fl    MPV 10.6 6.0 - 12.0 fL    Platelets 312 140 - 450 10*3/mm3   Basic Metabolic Panel    Specimen: Blood   Result Value Ref Range    Glucose 115 (H) 65 - 99 mg/dL    BUN 8 6 - 20 mg/dL    Creatinine 0.76 0.57 - 1.00 mg/dL    Sodium 137 136 - 145  mmol/L    Potassium 3.6 3.5 - 5.2 mmol/L    Chloride 102 98 - 107 mmol/L    CO2 23.0 22.0 - 29.0 mmol/L    Calcium 9.4 8.6 - 10.5 mg/dL    BUN/Creatinine Ratio 10.5 7.0 - 25.0    Anion Gap 12.0 5.0 - 15.0 mmol/L    eGFR 104.9 >60.0 mL/min/1.73   hCG, Serum, Qualitative    Specimen: Blood   Result Value Ref Range    HCG Qualitative Negative Negative   ECG 12 Lead   Result Value Ref Range    QT Interval 396 ms    QTC Interval 452 ms        Assessment:  Left UVJ stone colic hydronephrosis 4 mm in size left nephro calculi        Plan:    Left ureteroscopy with stone manipulation fragmentation possible stent placement R-B-O discussed with patient not limited to infection bleeding incomplete fragmentation answered procedures stricture disease use of a stent she understands agrees to proceed    I discussed the patient's findings and my recommendations with patient.     Gilberto Ramirez MD  08/29/24  07:52 EDT

## 2024-08-30 ENCOUNTER — ANESTHESIA EVENT (OUTPATIENT)
Dept: PERIOP | Facility: HOSPITAL | Age: 34
End: 2024-08-30
Payer: COMMERCIAL

## 2024-08-30 ENCOUNTER — ANESTHESIA (OUTPATIENT)
Dept: PERIOP | Facility: HOSPITAL | Age: 34
End: 2024-08-30
Payer: COMMERCIAL

## 2024-08-30 ENCOUNTER — APPOINTMENT (OUTPATIENT)
Dept: GENERAL RADIOLOGY | Facility: HOSPITAL | Age: 34
End: 2024-08-30
Payer: COMMERCIAL

## 2024-08-30 ENCOUNTER — HOSPITAL ENCOUNTER (OUTPATIENT)
Facility: HOSPITAL | Age: 34
Setting detail: HOSPITAL OUTPATIENT SURGERY
Discharge: HOME | End: 2024-08-30
Attending: UROLOGY | Admitting: UROLOGY
Payer: COMMERCIAL

## 2024-08-30 VITALS
SYSTOLIC BLOOD PRESSURE: 133 MMHG | HEART RATE: 86 BPM | TEMPERATURE: 98.6 F | RESPIRATION RATE: 16 BRPM | DIASTOLIC BLOOD PRESSURE: 77 MMHG | OXYGEN SATURATION: 94 %

## 2024-08-30 DIAGNOSIS — N20.0 KIDNEY STONES: ICD-10-CM

## 2024-08-30 DIAGNOSIS — N20.1 LEFT URETERAL STONE: Primary | ICD-10-CM

## 2024-08-30 PROCEDURE — C2617 STENT, NON-COR, TEM W/O DEL: HCPCS | Performed by: UROLOGY

## 2024-08-30 PROCEDURE — 25010000002 PROPOFOL 200 MG/20ML EMULSION: Performed by: ANESTHESIOLOGY

## 2024-08-30 PROCEDURE — 76000 FLUOROSCOPY <1 HR PHYS/QHP: CPT

## 2024-08-30 PROCEDURE — 25010000002 HYDROMORPHONE PER 4 MG: Performed by: ANESTHESIOLOGY

## 2024-08-30 PROCEDURE — C1769 GUIDE WIRE: HCPCS | Performed by: UROLOGY

## 2024-08-30 PROCEDURE — 82365 CALCULUS SPECTROSCOPY: CPT | Performed by: UROLOGY

## 2024-08-30 PROCEDURE — 25010000002 MAGNESIUM SULFATE PER 500 MG OF MAGNESIUM: Performed by: ANESTHESIOLOGY

## 2024-08-30 PROCEDURE — 25010000002 DEXAMETHASONE SODIUM PHOSPHATE 20 MG/5ML SOLUTION: Performed by: ANESTHESIOLOGY

## 2024-08-30 PROCEDURE — 25010000002 CEFAZOLIN PER 500 MG: Performed by: UROLOGY

## 2024-08-30 PROCEDURE — C1889 IMPLANT/INSERT DEVICE, NOC: HCPCS | Performed by: UROLOGY

## 2024-08-30 PROCEDURE — 25010000002 KETOROLAC TROMETHAMINE PER 15 MG: Performed by: ANESTHESIOLOGY

## 2024-08-30 PROCEDURE — 25510000001 IOPAMIDOL 61 % SOLUTION: Performed by: UROLOGY

## 2024-08-30 PROCEDURE — 25010000002 ONDANSETRON PER 1 MG: Performed by: ANESTHESIOLOGY

## 2024-08-30 PROCEDURE — 25810000003 LACTATED RINGERS PER 1000 ML: Performed by: ANESTHESIOLOGY

## 2024-08-30 DEVICE — URETERAL STENT
Type: IMPLANTABLE DEVICE | Site: URETER | Status: FUNCTIONAL
Brand: CONTOUR™

## 2024-08-30 RX ORDER — ONDANSETRON 4 MG/1
4 TABLET, ORALLY DISINTEGRATING ORAL EVERY 8 HOURS PRN
Qty: 10 TABLET | Refills: 1 | Status: SHIPPED | OUTPATIENT
Start: 2024-08-30

## 2024-08-30 RX ORDER — DROPERIDOL 2.5 MG/ML
0.62 INJECTION, SOLUTION INTRAMUSCULAR; INTRAVENOUS
Status: DISCONTINUED | OUTPATIENT
Start: 2024-08-30 | End: 2024-08-30 | Stop reason: HOSPADM

## 2024-08-30 RX ORDER — OXYCODONE AND ACETAMINOPHEN 7.5; 325 MG/1; MG/1
1 TABLET ORAL EVERY 4 HOURS PRN
Status: DISCONTINUED | OUTPATIENT
Start: 2024-08-30 | End: 2024-08-30 | Stop reason: HOSPADM

## 2024-08-30 RX ORDER — NALOXONE HCL 0.4 MG/ML
0.2 VIAL (ML) INJECTION AS NEEDED
Status: DISCONTINUED | OUTPATIENT
Start: 2024-08-30 | End: 2024-08-30 | Stop reason: HOSPADM

## 2024-08-30 RX ORDER — LIDOCAINE HYDROCHLORIDE 20 MG/ML
JELLY TOPICAL AS NEEDED
Status: DISCONTINUED | OUTPATIENT
Start: 2024-08-30 | End: 2024-08-30 | Stop reason: HOSPADM

## 2024-08-30 RX ORDER — EPHEDRINE SULFATE 50 MG/ML
5 INJECTION, SOLUTION INTRAVENOUS ONCE AS NEEDED
Status: DISCONTINUED | OUTPATIENT
Start: 2024-08-30 | End: 2024-08-30 | Stop reason: HOSPADM

## 2024-08-30 RX ORDER — DEXAMETHASONE SODIUM PHOSPHATE 4 MG/ML
INJECTION, SOLUTION INTRA-ARTICULAR; INTRALESIONAL; INTRAMUSCULAR; INTRAVENOUS; SOFT TISSUE AS NEEDED
Status: DISCONTINUED | OUTPATIENT
Start: 2024-08-30 | End: 2024-08-30 | Stop reason: SURG

## 2024-08-30 RX ORDER — MAGNESIUM HYDROXIDE 1200 MG/15ML
LIQUID ORAL AS NEEDED
Status: DISCONTINUED | OUTPATIENT
Start: 2024-08-30 | End: 2024-08-30 | Stop reason: HOSPADM

## 2024-08-30 RX ORDER — HYDROCODONE BITARTRATE AND ACETAMINOPHEN 5; 325 MG/1; MG/1
1 TABLET ORAL ONCE AS NEEDED
Status: DISCONTINUED | OUTPATIENT
Start: 2024-08-30 | End: 2024-08-30 | Stop reason: HOSPADM

## 2024-08-30 RX ORDER — FENTANYL CITRATE 50 UG/ML
50 INJECTION, SOLUTION INTRAMUSCULAR; INTRAVENOUS
Status: DISCONTINUED | OUTPATIENT
Start: 2024-08-30 | End: 2024-08-30 | Stop reason: HOSPADM

## 2024-08-30 RX ORDER — PROMETHAZINE HYDROCHLORIDE 25 MG/1
25 TABLET ORAL ONCE AS NEEDED
Status: DISCONTINUED | OUTPATIENT
Start: 2024-08-30 | End: 2024-08-30 | Stop reason: HOSPADM

## 2024-08-30 RX ORDER — FLUMAZENIL 0.1 MG/ML
0.2 INJECTION INTRAVENOUS AS NEEDED
Status: DISCONTINUED | OUTPATIENT
Start: 2024-08-30 | End: 2024-08-30 | Stop reason: HOSPADM

## 2024-08-30 RX ORDER — PHENAZOPYRIDINE HYDROCHLORIDE 100 MG/1
100 TABLET, FILM COATED ORAL ONCE
Status: COMPLETED | OUTPATIENT
Start: 2024-08-30 | End: 2024-08-30

## 2024-08-30 RX ORDER — LABETALOL HYDROCHLORIDE 5 MG/ML
5 INJECTION, SOLUTION INTRAVENOUS
Status: DISCONTINUED | OUTPATIENT
Start: 2024-08-30 | End: 2024-08-30 | Stop reason: HOSPADM

## 2024-08-30 RX ORDER — ONDANSETRON 2 MG/ML
INJECTION INTRAMUSCULAR; INTRAVENOUS AS NEEDED
Status: DISCONTINUED | OUTPATIENT
Start: 2024-08-30 | End: 2024-08-30 | Stop reason: SURG

## 2024-08-30 RX ORDER — FAMOTIDINE 10 MG/ML
20 INJECTION, SOLUTION INTRAVENOUS ONCE
Status: COMPLETED | OUTPATIENT
Start: 2024-08-30 | End: 2024-08-30

## 2024-08-30 RX ORDER — PROPOFOL 10 MG/ML
INJECTION, EMULSION INTRAVENOUS AS NEEDED
Status: DISCONTINUED | OUTPATIENT
Start: 2024-08-30 | End: 2024-08-30 | Stop reason: SURG

## 2024-08-30 RX ORDER — IOPAMIDOL 612 MG/ML
INJECTION, SOLUTION INTRAVASCULAR AS NEEDED
Status: DISCONTINUED | OUTPATIENT
Start: 2024-08-30 | End: 2024-08-30 | Stop reason: HOSPADM

## 2024-08-30 RX ORDER — DIPHENHYDRAMINE HYDROCHLORIDE 50 MG/ML
12.5 INJECTION INTRAMUSCULAR; INTRAVENOUS
Status: DISCONTINUED | OUTPATIENT
Start: 2024-08-30 | End: 2024-08-30 | Stop reason: HOSPADM

## 2024-08-30 RX ORDER — PROMETHAZINE HYDROCHLORIDE 25 MG/1
25 SUPPOSITORY RECTAL ONCE AS NEEDED
Status: DISCONTINUED | OUTPATIENT
Start: 2024-08-30 | End: 2024-08-30 | Stop reason: HOSPADM

## 2024-08-30 RX ORDER — LIDOCAINE HYDROCHLORIDE 20 MG/ML
INJECTION, SOLUTION INFILTRATION; PERINEURAL AS NEEDED
Status: DISCONTINUED | OUTPATIENT
Start: 2024-08-30 | End: 2024-08-30 | Stop reason: SURG

## 2024-08-30 RX ORDER — MAGNESIUM SULFATE HEPTAHYDRATE 500 MG/ML
INJECTION, SOLUTION INTRAMUSCULAR; INTRAVENOUS AS NEEDED
Status: DISCONTINUED | OUTPATIENT
Start: 2024-08-30 | End: 2024-08-30 | Stop reason: SURG

## 2024-08-30 RX ORDER — OXYCODONE AND ACETAMINOPHEN 5; 325 MG/1; MG/1
1 TABLET ORAL EVERY 6 HOURS PRN
Qty: 20 TABLET | Refills: 0 | Status: SHIPPED | OUTPATIENT
Start: 2024-08-30

## 2024-08-30 RX ORDER — HYDROMORPHONE HYDROCHLORIDE 1 MG/ML
0.5 INJECTION, SOLUTION INTRAMUSCULAR; INTRAVENOUS; SUBCUTANEOUS
Status: DISCONTINUED | OUTPATIENT
Start: 2024-08-30 | End: 2024-08-30 | Stop reason: HOSPADM

## 2024-08-30 RX ORDER — SODIUM CHLORIDE 0.9 % (FLUSH) 0.9 %
3-10 SYRINGE (ML) INJECTION AS NEEDED
Status: DISCONTINUED | OUTPATIENT
Start: 2024-08-30 | End: 2024-08-30 | Stop reason: HOSPADM

## 2024-08-30 RX ORDER — ONDANSETRON 2 MG/ML
4 INJECTION INTRAMUSCULAR; INTRAVENOUS ONCE AS NEEDED
Status: DISCONTINUED | OUTPATIENT
Start: 2024-08-30 | End: 2024-08-30 | Stop reason: HOSPADM

## 2024-08-30 RX ORDER — IPRATROPIUM BROMIDE AND ALBUTEROL SULFATE 2.5; .5 MG/3ML; MG/3ML
3 SOLUTION RESPIRATORY (INHALATION) ONCE AS NEEDED
Status: DISCONTINUED | OUTPATIENT
Start: 2024-08-30 | End: 2024-08-30 | Stop reason: HOSPADM

## 2024-08-30 RX ORDER — LIDOCAINE HYDROCHLORIDE 10 MG/ML
0.5 INJECTION, SOLUTION INFILTRATION; PERINEURAL ONCE AS NEEDED
Status: DISCONTINUED | OUTPATIENT
Start: 2024-08-30 | End: 2024-08-30 | Stop reason: HOSPADM

## 2024-08-30 RX ORDER — MIDAZOLAM HYDROCHLORIDE 1 MG/ML
1 INJECTION INTRAMUSCULAR; INTRAVENOUS
Status: DISCONTINUED | OUTPATIENT
Start: 2024-08-30 | End: 2024-08-30 | Stop reason: HOSPADM

## 2024-08-30 RX ORDER — PHENAZOPYRIDINE HYDROCHLORIDE 100 MG/1
100 TABLET, FILM COATED ORAL 3 TIMES DAILY PRN
Qty: 21 TABLET | Refills: 1 | Status: SHIPPED | OUTPATIENT
Start: 2024-08-30

## 2024-08-30 RX ORDER — SODIUM CHLORIDE 0.9 % (FLUSH) 0.9 %
3 SYRINGE (ML) INJECTION EVERY 12 HOURS SCHEDULED
Status: DISCONTINUED | OUTPATIENT
Start: 2024-08-30 | End: 2024-08-30 | Stop reason: HOSPADM

## 2024-08-30 RX ORDER — KETOROLAC TROMETHAMINE 30 MG/ML
INJECTION, SOLUTION INTRAMUSCULAR; INTRAVENOUS AS NEEDED
Status: DISCONTINUED | OUTPATIENT
Start: 2024-08-30 | End: 2024-08-30 | Stop reason: SURG

## 2024-08-30 RX ORDER — HYDRALAZINE HYDROCHLORIDE 20 MG/ML
5 INJECTION INTRAMUSCULAR; INTRAVENOUS
Status: DISCONTINUED | OUTPATIENT
Start: 2024-08-30 | End: 2024-08-30 | Stop reason: HOSPADM

## 2024-08-30 RX ORDER — SODIUM CHLORIDE, SODIUM LACTATE, POTASSIUM CHLORIDE, CALCIUM CHLORIDE 600; 310; 30; 20 MG/100ML; MG/100ML; MG/100ML; MG/100ML
9 INJECTION, SOLUTION INTRAVENOUS CONTINUOUS
Status: DISCONTINUED | OUTPATIENT
Start: 2024-08-30 | End: 2024-08-30 | Stop reason: HOSPADM

## 2024-08-30 RX ORDER — FENTANYL CITRATE 50 UG/ML
50 INJECTION, SOLUTION INTRAMUSCULAR; INTRAVENOUS ONCE AS NEEDED
Status: DISCONTINUED | OUTPATIENT
Start: 2024-08-30 | End: 2024-08-30 | Stop reason: HOSPADM

## 2024-08-30 RX ADMIN — PROPOFOL 50 MG: 10 INJECTION, EMULSION INTRAVENOUS at 17:44

## 2024-08-30 RX ADMIN — HYDROMORPHONE HYDROCHLORIDE 0.5 MG: 1 INJECTION, SOLUTION INTRAMUSCULAR; INTRAVENOUS; SUBCUTANEOUS at 19:02

## 2024-08-30 RX ADMIN — SODIUM CHLORIDE 2000 MG: 900 INJECTION INTRAVENOUS at 17:17

## 2024-08-30 RX ADMIN — PROPOFOL 20 MG: 10 INJECTION, EMULSION INTRAVENOUS at 17:53

## 2024-08-30 RX ADMIN — DEXAMETHASONE SODIUM PHOSPHATE 8 MG: 4 INJECTION, SOLUTION INTRAMUSCULAR; INTRAVENOUS at 17:27

## 2024-08-30 RX ADMIN — HYDROMORPHONE HYDROCHLORIDE 0.5 MG: 1 INJECTION, SOLUTION INTRAMUSCULAR; INTRAVENOUS; SUBCUTANEOUS at 18:43

## 2024-08-30 RX ADMIN — MAGNESIUM SULFATE HEPTAHYDRATE 1 G: 500 INJECTION, SOLUTION INTRAMUSCULAR; INTRAVENOUS at 17:29

## 2024-08-30 RX ADMIN — OXYCODONE AND ACETAMINOPHEN 1 TABLET: 7.5; 325 TABLET ORAL at 18:26

## 2024-08-30 RX ADMIN — SODIUM CHLORIDE, POTASSIUM CHLORIDE, SODIUM LACTATE AND CALCIUM CHLORIDE 9 ML/HR: 600; 310; 30; 20 INJECTION, SOLUTION INTRAVENOUS at 15:33

## 2024-08-30 RX ADMIN — KETOROLAC TROMETHAMINE 30 MG: 30 INJECTION, SOLUTION INTRAMUSCULAR at 18:03

## 2024-08-30 RX ADMIN — LIDOCAINE HYDROCHLORIDE 100 MG: 20 INJECTION, SOLUTION INFILTRATION; PERINEURAL at 17:24

## 2024-08-30 RX ADMIN — FAMOTIDINE 20 MG: 10 INJECTION INTRAVENOUS at 15:33

## 2024-08-30 RX ADMIN — PROPOFOL 200 MG: 10 INJECTION, EMULSION INTRAVENOUS at 17:24

## 2024-08-30 RX ADMIN — PROPOFOL 30 MG: 10 INJECTION, EMULSION INTRAVENOUS at 17:48

## 2024-08-30 RX ADMIN — PROPOFOL 30 MG: 10 INJECTION, EMULSION INTRAVENOUS at 17:30

## 2024-08-30 RX ADMIN — PROPOFOL 20 MG: 10 INJECTION, EMULSION INTRAVENOUS at 17:37

## 2024-08-30 RX ADMIN — HYDROMORPHONE HYDROCHLORIDE 0.5 MG: 1 INJECTION, SOLUTION INTRAMUSCULAR; INTRAVENOUS; SUBCUTANEOUS at 18:25

## 2024-08-30 RX ADMIN — ONDANSETRON 4 MG: 2 INJECTION INTRAMUSCULAR; INTRAVENOUS at 17:27

## 2024-08-30 RX ADMIN — PHENAZOPYRIDINE 100 MG: 100 TABLET ORAL at 18:59

## 2024-08-30 NOTE — INTERVAL H&P NOTE
H&P reviewed. The patient was examined and there are no changes to the H&P.      /84 (BP Location: Right arm, Patient Position: Lying)   Pulse 86   Temp 99.2 °F (37.3 °C) (Oral)   Resp 16   SpO2 97%

## 2024-08-30 NOTE — ANESTHESIA PROCEDURE NOTES
Airway  Date/Time: 8/30/2024 5:24 PM  Airway not difficult    General Information and Staff    Anesthesiologist: Larry Garces MD    Indications and Patient Condition    Preoxygenated: yes      Final Airway Details  Final airway type: supraglottic airway      Successful airway: unique  Size 4

## 2024-08-30 NOTE — ANESTHESIA POSTPROCEDURE EVALUATION
Patient: Denise Acosta    Procedure Summary       Date: 08/30/24 Room / Location: [facility] OR  / [facility] MAIN OR    Anesthesia Start: 1718 Anesthesia Stop: 1820    Procedure: LEFT URETEROSCOPY BASKET STONE EXTRACTION AND STENT PLACEMENT (Left) Diagnosis:     Surgeons: Gilberto Ramirez MD Provider: Larry Garces MD    Anesthesia Type: general ASA Status: 3            Anesthesia Type: general    Vitals  Vitals Value Taken Time   /80 08/30/24 1900   Temp 37 °C (98.6 °F) 08/30/24 1812   Pulse 77 08/30/24 1907   Resp 16 08/30/24 1830   SpO2 98 % 08/30/24 1907   Vitals shown include unfiled device data.        Post Anesthesia Care and Evaluation    Patient location during evaluation: bedside  Patient participation: complete - patient participated  Level of consciousness: awake and alert  Pain management: adequate    Airway patency: patent  Anesthetic complications: No anesthetic complications  PONV Status: controlled  Cardiovascular status: blood pressure returned to baseline and acceptable  Respiratory status: acceptable  Hydration status: acceptable

## 2024-08-30 NOTE — ANESTHESIA PREPROCEDURE EVALUATION
Anesthesia Evaluation     Patient summary reviewed and Nursing notes reviewed   history of anesthetic complications:  PONV  NPO Solid Status: N/A             Airway   Mallampati: II  TM distance: >3 FB  Neck ROM: full  No difficulty expected  Dental      Pulmonary - negative pulmonary ROS   Cardiovascular     Rhythm: regular    (+) hyperlipidemia      Neuro/Psych  (+) psychiatric history  GI/Hepatic/Renal/Endo    (+) morbid obesity, GERD, renal disease- stones    Musculoskeletal (-) negative ROS    Abdominal   (+) obese   Substance History - negative use     OB/GYN negative ob/gyn ROS         Other                    Anesthesia Plan    ASA 3     general     intravenous induction     Anesthetic plan, risks, benefits, and alternatives have been provided, discussed and informed consent has been obtained with: patient.    CODE STATUS:

## 2024-08-30 NOTE — BRIEF OP NOTE
URETEROSCOPY LASER LITHOTRIPSY WITH STENT INSERTION  Progress Note    Denise Acosta  8/30/2024    Pre-op Diagnosis:   Left distal ureteral stone inability to pass       Post-Op Diagnosis Codes:  Same    Procedure/CPT® Codes:        Procedure(s):  LEFT URETEROSCOPY STONE EXTRACTION BASKET AND STENT placement              Surgeon(s):  Gilberto Ramirez MD    Anesthesia: General    Staff:   * No surgical staff found *         Estimated Blood Loss: none    Urine Voided: * No values recorded between 8/30/2024 12:00 AM and 8/30/2024  3:39 PM *    Specimens:                None          Drains: * No LDAs found *    Findings: 2 very small stones in distal ureter basket extracted sent for chemical analysis        Complications: None results called to [place] at [phone]          Gilberto Ramirez MD     Date: 8/30/2024  Time: 15:39 EDT

## 2024-08-30 NOTE — OP NOTE
Preoperative diagnosis distal ureteral stones inability to pass with intermittent colic frequency and urgency    Postoperative diagnosis same stones extracted stent placed    Operative procedure left ureteroscopy basket traction of the small distal ureteral stone sent for chemical analysis and placement of a 6 Guatemalan by 26 cm Percuflex double-J stent without tether under fluoroscopic guidance    Surgeon James Vitale General    Procedure note 34-year-old female was trying to pass her stone spontaneously over the past couple months with no success continued intermittent discomfort frequency and urgency she is undergo the above-mentioned procedure site was marked antibiotics were given timeout was taken allergies reviewed reviewed after adequate general anesthesia was placed very carefully modified dorsolithotomy position all pressure points relieved prepped and draped sterile fashion of genitalia 2% intraurethral lidocaine jelly is administered scope was advanced into the bladder trigone was normal orifice ease normal position figuration the bladder was normal sensor guidewire is placed up on the left side fluoroscopically dilation to approximately 10 Guatemalan with a short mini rigid ureteroscope advanced stones were engaged just outside the bladder wall and these were removed reinspection revealed a mildly dilated ureter with no other stone seen up to the pelvic brim guidewire in good placement    Subsequently cystoscope was placed back over the guidewire and a 6 Guatemalan by 26 cm stent was placed under fluoroscopic guidance with good curling in the upper collecting system and bladder without tether the patient procedure well and was sent to the recovery in satisfactory condition findings were discussed and called to Aden at [phone]    Disposition discharged today with outpatient instruction sheet my office call for stent removal in approximately 1 week medications on discharge include Keflex suppression  Pyridium narcotic and antinausea medication

## 2024-08-31 ENCOUNTER — APPOINTMENT (OUTPATIENT)
Dept: CT IMAGING | Facility: HOSPITAL | Age: 34
End: 2024-08-31
Payer: COMMERCIAL

## 2024-08-31 ENCOUNTER — HOSPITAL ENCOUNTER (OUTPATIENT)
Facility: HOSPITAL | Age: 34
Setting detail: OBSERVATION
Discharge: HOME | End: 2024-09-01
Attending: EMERGENCY MEDICINE | Admitting: STUDENT IN AN ORGANIZED HEALTH CARE EDUCATION/TRAINING PROGRAM
Payer: COMMERCIAL

## 2024-08-31 DIAGNOSIS — N39.0 SEPSIS DUE TO URINARY TRACT INFECTION: Primary | ICD-10-CM

## 2024-08-31 DIAGNOSIS — A41.9 SEPSIS DUE TO URINARY TRACT INFECTION: Primary | ICD-10-CM

## 2024-08-31 LAB
ALBUMIN SERPL-MCNC: 4.4 G/DL (ref 3.5–5.2)
ALBUMIN/GLOB SERPL: 1.3 G/DL
ALP SERPL-CCNC: 106 U/L (ref 39–117)
ALT SERPL W P-5'-P-CCNC: 19 U/L (ref 1–33)
ANION GAP SERPL CALCULATED.3IONS-SCNC: 10.6 MMOL/L (ref 5–15)
ANION GAP SERPL CALCULATED.3IONS-SCNC: 13.1 MMOL/L (ref 5–15)
AST SERPL-CCNC: 20 U/L (ref 1–32)
BACTERIA UR QL AUTO: ABNORMAL /HPF
BASOPHILS # BLD AUTO: 0.03 10*3/MM3 (ref 0–0.2)
BASOPHILS # BLD AUTO: 0.05 10*3/MM3 (ref 0–0.2)
BASOPHILS NFR BLD AUTO: 0.2 % (ref 0–1.5)
BASOPHILS NFR BLD AUTO: 0.2 % (ref 0–1.5)
BILIRUB SERPL-MCNC: 0.2 MG/DL (ref 0–1.2)
BILIRUB UR QL STRIP: ABNORMAL
BUN SERPL-MCNC: 10 MG/DL (ref 6–20)
BUN SERPL-MCNC: 11 MG/DL (ref 6–20)
BUN/CREAT SERPL: 13.6 (ref 7–25)
BUN/CREAT SERPL: 14.3 (ref 7–25)
CALCIUM SPEC-SCNC: 7.5 MG/DL (ref 8.6–10.5)
CALCIUM SPEC-SCNC: 9 MG/DL (ref 8.6–10.5)
CHLORIDE SERPL-SCNC: 102 MMOL/L (ref 98–107)
CHLORIDE SERPL-SCNC: 107 MMOL/L (ref 98–107)
CLARITY UR: ABNORMAL
CO2 SERPL-SCNC: 21.4 MMOL/L (ref 22–29)
CO2 SERPL-SCNC: 22.9 MMOL/L (ref 22–29)
COLOR UR: ABNORMAL
CREAT SERPL-MCNC: 0.7 MG/DL (ref 0.57–1)
CREAT SERPL-MCNC: 0.81 MG/DL (ref 0.57–1)
D-LACTATE SERPL-SCNC: 2.1 MMOL/L (ref 0.5–2)
D-LACTATE SERPL-SCNC: 2.5 MMOL/L (ref 0.5–2)
DEPRECATED RDW RBC AUTO: 37.3 FL (ref 37–54)
DEPRECATED RDW RBC AUTO: 39.7 FL (ref 37–54)
EGFRCR SERPLBLD CKD-EPI 2021: 115.8 ML/MIN/1.73
EGFRCR SERPLBLD CKD-EPI 2021: 97.2 ML/MIN/1.73
EOSINOPHIL # BLD AUTO: 0.04 10*3/MM3 (ref 0–0.4)
EOSINOPHIL # BLD AUTO: 0.07 10*3/MM3 (ref 0–0.4)
EOSINOPHIL NFR BLD AUTO: 0.2 % (ref 0.3–6.2)
EOSINOPHIL NFR BLD AUTO: 0.4 % (ref 0.3–6.2)
ERYTHROCYTE [DISTWIDTH] IN BLOOD BY AUTOMATED COUNT: 12.8 % (ref 12.3–15.4)
ERYTHROCYTE [DISTWIDTH] IN BLOOD BY AUTOMATED COUNT: 12.9 % (ref 12.3–15.4)
GLOBULIN UR ELPH-MCNC: 3.4 GM/DL
GLUCOSE SERPL-MCNC: 118 MG/DL (ref 65–99)
GLUCOSE SERPL-MCNC: 132 MG/DL (ref 65–99)
GLUCOSE UR STRIP-MCNC: ABNORMAL MG/DL
HCT VFR BLD AUTO: 36.9 % (ref 34–46.6)
HCT VFR BLD AUTO: 41.4 % (ref 34–46.6)
HGB BLD-MCNC: 11.9 G/DL (ref 12–15.9)
HGB BLD-MCNC: 13.9 G/DL (ref 12–15.9)
HGB UR QL STRIP.AUTO: ABNORMAL
HYALINE CASTS UR QL AUTO: ABNORMAL /LPF
IMM GRANULOCYTES # BLD AUTO: 0.1 10*3/MM3 (ref 0–0.05)
IMM GRANULOCYTES # BLD AUTO: 0.16 10*3/MM3 (ref 0–0.05)
IMM GRANULOCYTES NFR BLD AUTO: 0.5 % (ref 0–0.5)
IMM GRANULOCYTES NFR BLD AUTO: 0.6 % (ref 0–0.5)
KETONES UR QL STRIP: ABNORMAL
LEUKOCYTE ESTERASE UR QL STRIP.AUTO: NEGATIVE
LYMPHOCYTES # BLD AUTO: 1.59 10*3/MM3 (ref 0.7–3.1)
LYMPHOCYTES # BLD AUTO: 1.81 10*3/MM3 (ref 0.7–3.1)
LYMPHOCYTES NFR BLD AUTO: 6.3 % (ref 19.6–45.3)
LYMPHOCYTES NFR BLD AUTO: 9.6 % (ref 19.6–45.3)
MCH RBC QN AUTO: 27.3 PG (ref 26.6–33)
MCH RBC QN AUTO: 27.4 PG (ref 26.6–33)
MCHC RBC AUTO-ENTMCNC: 32.2 G/DL (ref 31.5–35.7)
MCHC RBC AUTO-ENTMCNC: 33.6 G/DL (ref 31.5–35.7)
MCV RBC AUTO: 81.3 FL (ref 79–97)
MCV RBC AUTO: 85 FL (ref 79–97)
MONOCYTES # BLD AUTO: 0.63 10*3/MM3 (ref 0.1–0.9)
MONOCYTES # BLD AUTO: 1.09 10*3/MM3 (ref 0.1–0.9)
MONOCYTES NFR BLD AUTO: 3.3 % (ref 5–12)
MONOCYTES NFR BLD AUTO: 4.3 % (ref 5–12)
NEUTROPHILS NFR BLD AUTO: 16.2 10*3/MM3 (ref 1.7–7)
NEUTROPHILS NFR BLD AUTO: 22.49 10*3/MM3 (ref 1.7–7)
NEUTROPHILS NFR BLD AUTO: 86 % (ref 42.7–76)
NEUTROPHILS NFR BLD AUTO: 88.4 % (ref 42.7–76)
NITRITE UR QL STRIP: POSITIVE
NRBC BLD AUTO-RTO: 0 /100 WBC (ref 0–0.2)
NRBC BLD AUTO-RTO: 0 /100 WBC (ref 0–0.2)
PH UR STRIP.AUTO: <=5 [PH] (ref 4.5–8)
PLATELET # BLD AUTO: 256 10*3/MM3 (ref 140–450)
PLATELET # BLD AUTO: 336 10*3/MM3 (ref 140–450)
PMV BLD AUTO: 10.6 FL (ref 6–12)
PMV BLD AUTO: 10.6 FL (ref 6–12)
POTASSIUM SERPL-SCNC: 3.8 MMOL/L (ref 3.5–5.2)
POTASSIUM SERPL-SCNC: 3.9 MMOL/L (ref 3.5–5.2)
PROT SERPL-MCNC: 7.8 G/DL (ref 6–8.5)
PROT UR QL STRIP: ABNORMAL
RBC # BLD AUTO: 4.34 10*6/MM3 (ref 3.77–5.28)
RBC # BLD AUTO: 5.09 10*6/MM3 (ref 3.77–5.28)
RBC # UR STRIP: ABNORMAL /HPF
REF LAB TEST METHOD: ABNORMAL
SODIUM SERPL-SCNC: 138 MMOL/L (ref 136–145)
SODIUM SERPL-SCNC: 139 MMOL/L (ref 136–145)
SP GR UR STRIP: 1.03 (ref 1–1.03)
SQUAMOUS #/AREA URNS HPF: ABNORMAL /HPF
UROBILINOGEN UR QL STRIP: ABNORMAL
WBC # UR STRIP: ABNORMAL /HPF
WBC NRBC COR # BLD AUTO: 18.84 10*3/MM3 (ref 3.4–10.8)
WBC NRBC COR # BLD AUTO: 25.42 10*3/MM3 (ref 3.4–10.8)

## 2024-08-31 PROCEDURE — 25810000003 SODIUM CHLORIDE 0.9 % SOLUTION: Performed by: EMERGENCY MEDICINE

## 2024-08-31 PROCEDURE — G0378 HOSPITAL OBSERVATION PER HR: HCPCS

## 2024-08-31 PROCEDURE — 96361 HYDRATE IV INFUSION ADD-ON: CPT

## 2024-08-31 PROCEDURE — 99285 EMERGENCY DEPT VISIT HI MDM: CPT | Performed by: EMERGENCY MEDICINE

## 2024-08-31 PROCEDURE — 25010000002 CEFTRIAXONE PER 250 MG: Performed by: EMERGENCY MEDICINE

## 2024-08-31 PROCEDURE — 80053 COMPREHEN METABOLIC PANEL: CPT | Performed by: EMERGENCY MEDICINE

## 2024-08-31 PROCEDURE — 87040 BLOOD CULTURE FOR BACTERIA: CPT | Performed by: EMERGENCY MEDICINE

## 2024-08-31 PROCEDURE — 96375 TX/PRO/DX INJ NEW DRUG ADDON: CPT

## 2024-08-31 PROCEDURE — 96365 THER/PROPH/DIAG IV INF INIT: CPT

## 2024-08-31 PROCEDURE — 87086 URINE CULTURE/COLONY COUNT: CPT | Performed by: EMERGENCY MEDICINE

## 2024-08-31 PROCEDURE — 99222 1ST HOSP IP/OBS MODERATE 55: CPT | Performed by: HOSPITALIST

## 2024-08-31 PROCEDURE — 99285 EMERGENCY DEPT VISIT HI MDM: CPT

## 2024-08-31 PROCEDURE — 25010000002 VANCOMYCIN 5 G RECONSTITUTED SOLUTION 5,000 MG VIAL: Performed by: EMERGENCY MEDICINE

## 2024-08-31 PROCEDURE — 85025 COMPLETE CBC W/AUTO DIFF WBC: CPT | Performed by: EMERGENCY MEDICINE

## 2024-08-31 PROCEDURE — 25010000002 FENTANYL CITRATE (PF) 50 MCG/ML SOLUTION: Performed by: EMERGENCY MEDICINE

## 2024-08-31 PROCEDURE — 25010000002 ONDANSETRON PER 1 MG: Performed by: EMERGENCY MEDICINE

## 2024-08-31 PROCEDURE — 83605 ASSAY OF LACTIC ACID: CPT | Performed by: EMERGENCY MEDICINE

## 2024-08-31 PROCEDURE — 81001 URINALYSIS AUTO W/SCOPE: CPT | Performed by: EMERGENCY MEDICINE

## 2024-08-31 PROCEDURE — 25810000003 SODIUM CHLORIDE 0.9 % SOLUTION 500 ML FLEX CONT: Performed by: EMERGENCY MEDICINE

## 2024-08-31 PROCEDURE — 85025 COMPLETE CBC W/AUTO DIFF WBC: CPT | Performed by: HOSPITALIST

## 2024-08-31 PROCEDURE — 74176 CT ABD & PELVIS W/O CONTRAST: CPT

## 2024-08-31 RX ORDER — HYDROXYZINE HYDROCHLORIDE 25 MG/1
100 TABLET, FILM COATED ORAL NIGHTLY
Status: DISCONTINUED | OUTPATIENT
Start: 2024-08-31 | End: 2024-09-01 | Stop reason: HOSPADM

## 2024-08-31 RX ORDER — SODIUM CHLORIDE 0.9 % (FLUSH) 0.9 %
10 SYRINGE (ML) INJECTION EVERY 12 HOURS SCHEDULED
Status: DISCONTINUED | OUTPATIENT
Start: 2024-08-31 | End: 2024-09-01 | Stop reason: HOSPADM

## 2024-08-31 RX ORDER — VANCOMYCIN HYDROCHLORIDE 1 G/20ML
INJECTION, POWDER, LYOPHILIZED, FOR SOLUTION INTRAVENOUS
Status: DISPENSED
Start: 2024-08-31 | End: 2024-09-01

## 2024-08-31 RX ORDER — POLYETHYLENE GLYCOL 3350 17 G/17G
17 POWDER, FOR SOLUTION ORAL DAILY PRN
Status: DISCONTINUED | OUTPATIENT
Start: 2024-08-31 | End: 2024-09-01 | Stop reason: HOSPADM

## 2024-08-31 RX ORDER — ATORVASTATIN CALCIUM 40 MG/1
80 TABLET, FILM COATED ORAL NIGHTLY
Status: DISCONTINUED | OUTPATIENT
Start: 2024-08-31 | End: 2024-09-01 | Stop reason: HOSPADM

## 2024-08-31 RX ORDER — PHENAZOPYRIDINE HYDROCHLORIDE 100 MG/1
100 TABLET, FILM COATED ORAL 3 TIMES DAILY PRN
Status: DISCONTINUED | OUTPATIENT
Start: 2024-08-31 | End: 2024-09-01 | Stop reason: HOSPADM

## 2024-08-31 RX ORDER — ONDANSETRON 2 MG/ML
8 INJECTION INTRAMUSCULAR; INTRAVENOUS ONCE
Status: COMPLETED | OUTPATIENT
Start: 2024-08-31 | End: 2024-08-31

## 2024-08-31 RX ORDER — SODIUM CHLORIDE 9 MG/ML
INJECTION, SOLUTION INTRAVENOUS
Status: DISPENSED
Start: 2024-08-31 | End: 2024-09-01

## 2024-08-31 RX ORDER — SODIUM CHLORIDE 0.9 % (FLUSH) 0.9 %
10 SYRINGE (ML) INJECTION AS NEEDED
Status: DISCONTINUED | OUTPATIENT
Start: 2024-08-31 | End: 2024-09-01 | Stop reason: HOSPADM

## 2024-08-31 RX ORDER — AMOXICILLIN 250 MG
2 CAPSULE ORAL 2 TIMES DAILY PRN
Status: DISCONTINUED | OUTPATIENT
Start: 2024-08-31 | End: 2024-09-01 | Stop reason: HOSPADM

## 2024-08-31 RX ORDER — MORPHINE SULFATE 2 MG/ML
2 INJECTION, SOLUTION INTRAMUSCULAR; INTRAVENOUS EVERY 4 HOURS PRN
Status: DISCONTINUED | OUTPATIENT
Start: 2024-08-31 | End: 2024-09-01 | Stop reason: HOSPADM

## 2024-08-31 RX ORDER — FENTANYL CITRATE 50 UG/ML
100 INJECTION, SOLUTION INTRAMUSCULAR; INTRAVENOUS ONCE
Status: COMPLETED | OUTPATIENT
Start: 2024-08-31 | End: 2024-08-31

## 2024-08-31 RX ORDER — ONDANSETRON 2 MG/ML
4 INJECTION INTRAMUSCULAR; INTRAVENOUS EVERY 6 HOURS PRN
Status: DISCONTINUED | OUTPATIENT
Start: 2024-08-31 | End: 2024-09-01 | Stop reason: HOSPADM

## 2024-08-31 RX ORDER — SODIUM CHLORIDE 9 MG/ML
40 INJECTION, SOLUTION INTRAVENOUS AS NEEDED
Status: DISCONTINUED | OUTPATIENT
Start: 2024-08-31 | End: 2024-09-01 | Stop reason: HOSPADM

## 2024-08-31 RX ORDER — BISACODYL 5 MG/1
5 TABLET, DELAYED RELEASE ORAL DAILY PRN
Status: DISCONTINUED | OUTPATIENT
Start: 2024-08-31 | End: 2024-09-01 | Stop reason: HOSPADM

## 2024-08-31 RX ORDER — FAMOTIDINE 20 MG/1
20 TABLET, FILM COATED ORAL 2 TIMES DAILY
Status: DISCONTINUED | OUTPATIENT
Start: 2024-08-31 | End: 2024-09-01 | Stop reason: HOSPADM

## 2024-08-31 RX ORDER — BISACODYL 10 MG
10 SUPPOSITORY, RECTAL RECTAL DAILY PRN
Status: DISCONTINUED | OUTPATIENT
Start: 2024-08-31 | End: 2024-09-01 | Stop reason: HOSPADM

## 2024-08-31 RX ORDER — SODIUM CHLORIDE 9 MG/ML
100 INJECTION, SOLUTION INTRAVENOUS CONTINUOUS
Status: DISCONTINUED | OUTPATIENT
Start: 2024-08-31 | End: 2024-09-01 | Stop reason: HOSPADM

## 2024-08-31 RX ORDER — OXYCODONE AND ACETAMINOPHEN 5; 325 MG/1; MG/1
1 TABLET ORAL EVERY 6 HOURS PRN
Status: DISCONTINUED | OUTPATIENT
Start: 2024-08-31 | End: 2024-09-01 | Stop reason: HOSPADM

## 2024-08-31 RX ORDER — SODIUM CHLORIDE 9 MG/ML
INJECTION, SOLUTION INTRAVENOUS
Status: DISCONTINUED
Start: 2024-08-31 | End: 2024-08-31 | Stop reason: WASHOUT

## 2024-08-31 RX ORDER — VANCOMYCIN HYDROCHLORIDE 750 MG/15ML
INJECTION, POWDER, LYOPHILIZED, FOR SOLUTION INTRAVENOUS
Status: DISPENSED
Start: 2024-08-31 | End: 2024-09-01

## 2024-08-31 RX ADMIN — HYDROXYZINE HYDROCHLORIDE 100 MG: 25 TABLET ORAL at 21:30

## 2024-08-31 RX ADMIN — ONDANSETRON 8 MG: 2 INJECTION INTRAMUSCULAR; INTRAVENOUS at 18:14

## 2024-08-31 RX ADMIN — ATORVASTATIN CALCIUM 80 MG: 40 TABLET, FILM COATED ORAL at 21:30

## 2024-08-31 RX ADMIN — PHENAZOPYRIDINE HYDROCHLORIDE 100 MG: 100 TABLET ORAL at 22:25

## 2024-08-31 RX ADMIN — OXYCODONE HYDROCHLORIDE AND ACETAMINOPHEN 1 TABLET: 5; 325 TABLET ORAL at 22:25

## 2024-08-31 RX ADMIN — SODIUM CHLORIDE 1000 ML: 9 INJECTION, SOLUTION INTRAVENOUS at 18:14

## 2024-08-31 RX ADMIN — FENTANYL CITRATE 100 MCG: 50 INJECTION INTRAMUSCULAR; INTRAVENOUS at 18:14

## 2024-08-31 RX ADMIN — SODIUM CHLORIDE 2000 ML: 9 INJECTION, SOLUTION INTRAVENOUS at 20:09

## 2024-08-31 RX ADMIN — FAMOTIDINE 20 MG: 20 TABLET ORAL at 21:30

## 2024-08-31 RX ADMIN — SODIUM CHLORIDE 2000 MG: 9 INJECTION, SOLUTION INTRAVENOUS at 19:49

## 2024-08-31 RX ADMIN — VANCOMYCIN HYDROCHLORIDE 1750 MG: 5 INJECTION, POWDER, LYOPHILIZED, FOR SOLUTION INTRAVENOUS at 20:24

## 2024-08-31 NOTE — LETTER
Setember 1, 2024      [facility]  [address] [id number] [phone]          Patient: Denise Acosta   YOB: 1990   Date of Visit: 8/31/24       To Whom It May Concern:    Denise Acosta was seen at [facility] on 8/31/2024.    Please excuse Denise from work 8/30/24 - 9/9/24 or until directed by Urology.  No lift / tugging / pulling until seen by Urology on 9/6/24.          Sincerely,

## 2024-08-31 NOTE — ED PROVIDER NOTES
Subjective   History of Present Illness    Chief complaint: Flank    Location: Left, rating to the left lower quad    Quality/Severity: Severe, sharp    Timing/Onset/Duration: Got worse just prior to arrival, present since procedure yesterday    Modifying Factors: Not relieved with Percocet that the patient took at 1:00 PM    Associated Symptoms: No headache.  No fever chills or cough.  No sore throat earache or nasal congestion.  No chest pain or shortness of breath.  No diarrhea or burning when she urinates.  Patient's had some nausea, no vomiting.    Narrative: This 34-year-old presents with left-sided flank pain that radiates into her abdomen.  Patient had a stent put in yesterday.  Patient states the pain has been there since.    PCP:Maria Isabel Easton PA    Urology: James      Review of Systems   Constitutional:  Negative for chills and fever.   HENT:  Positive for sore throat (Since surgery.). Negative for congestion and ear pain.    Respiratory:  Negative for cough and shortness of breath.    Cardiovascular:  Negative for chest pain.   Gastrointestinal:  Positive for abdominal pain and nausea. Negative for diarrhea and vomiting.   Genitourinary:  Positive for flank pain (Left). Negative for dysuria.   Musculoskeletal:  Negative for back pain.   Skin:  Negative for rash.   Neurological:  Negative for headaches.         Past Medical History:   Diagnosis Date    Anxiety     Blood in urine     Clostridium difficile diarrhea 2018    Elevated cholesterol     GERD (gastroesophageal reflux disease)     History of Clostridioides difficile colitis     IBS (irritable bowel syndrome)     Kidney stones     Migraines     PONV (postoperative nausea and vomiting)     Postural hypotension     Sepsis 04/2014    Tachycardia     PERIODS OF TACHYCARDIA WITH HOLTER MONITOR- MOST LIKELY RELATED TO KIDNEY STONES. APPOINTMENT WITH DR. SCOTT 9-16-24.       Allergies   Allergen Reactions    Sulfa Antibiotics Rash     Sulfasalazine Rash and Other (See Comments)    Sulfamethoxazole-Trimethoprim Other (See Comments)    Etodolac Hives     Annotation - 04Nov2019: hives  Annotation - 04Nov2019: hives  Annotation - 04Nov2019: hives      Hydrocodone-Acetaminophen Nausea And Vomiting    Methocarbamol Hives     Annotation - 04Nov2019: hives  Annotation - 04Nov2019: hives  Annotation - 04Nov2019: hives      Sumatriptan Other (See Comments)     Feels like having heart attack. Has chest pain and left arm heaviness       Past Surgical History:   Procedure Laterality Date    CHOLECYSTECTOMY      D & C HYSTEROSCOPY ENDOMETRIAL ABLATION N/A 7/17/2023    Procedure: HYSTEROSCOPY NOVASURE ENDOMETRIAL ABLATION;  Surgeon: Troy Castro MD;  Location: [facility] OR;  Service: Obstetrics/Gynecology;  Laterality: N/A;    SALPINGECTOMY Bilateral 7/17/2023    Procedure: SALPINGECTOMY LAPAROSCOPIC;  Surgeon: Troy Castro MD;  Location: [facility] OR;  Service: Obstetrics/Gynecology;  Laterality: Bilateral;    WISDOM TOOTH EXTRACTION         Family History   Problem Relation Age of Onset    Multiple myeloma Maternal Grandmother     Heart disease Maternal Grandfather     Diabetes Paternal Grandfather     Malig Hyperthermia Neg Hx        Social History     Socioeconomic History    Marital status:    Tobacco Use    Smoking status: Never    Smokeless tobacco: Never   Vaping Use    Vaping status: Never Used   Substance and Sexual Activity    Alcohol use: No    Drug use: No    Sexual activity: Yes     Partners: Male     Comment:            Objective   Physical Exam  Vitals (The temperature is 98.4 °F, pulse 82, respirations 16, /89, room air pulse ox 97%.) and nursing note reviewed.   Constitutional:       Comments: Patient appears to be in pain   HENT:      Head: Normocephalic and atraumatic.   Cardiovascular:      Rate and Rhythm: Normal rate and regular rhythm.      Pulses: Normal pulses.      Heart sounds: Normal heart  sounds. No murmur heard.     No friction rub. No gallop.   Pulmonary:      Effort: Pulmonary effort is normal.      Breath sounds: Normal breath sounds.   Abdominal:      General: Abdomen is flat. Bowel sounds are normal. There is no distension.      Palpations: Abdomen is soft. There is no mass.      Tenderness: There is abdominal tenderness (Mild left lower quad). There is left CVA tenderness. There is no guarding or rebound.      Hernia: No hernia is present.   Musculoskeletal:         General: No swelling or tenderness. Normal range of motion.   Skin:     General: Skin is warm and dry.      Capillary Refill: Capillary refill takes less than 2 seconds.   Neurological:      General: No focal deficit present.      Mental Status: She is alert and oriented to person, place, and time.      Sensory: No sensory deficit.      Motor: No weakness.         Procedures           ED Course  ED Course as of 08/31/24 1857   Sat Aug 31, 2024   1825 The urinalysis shows too numerous to count red blood cells, no white blood cells, no bacteria, leukocytes negative, nitrite positive. [RC]   1826 The white blood cell count is 25,000, hemoglobin 13, hematocrit 41, platelet count 336,000, 88% neutrophils, otherwise unremarkable [RC]   1834 The glucose is 132, the GFR is 97, the CMP is otherwise unremarkable. [RC]   1855 The lactic acid is 2.5. [RC]      ED Course User Index  [RC] Jasvir Randolph MD      18:57 EDT, 08/31/24:  Dr. Ramirez, the patient's urologist has been paged out.    18:59 EDT, 08/31/24:  The patient was reassessed.  She feels better.  She rates her pain as 4/10.  Abdominal exam: Soft, mild left-sided abdominal tenderness, no rebound, no guarding, no masses, positive bowel sounds.  The patient will be given Rocephin and vancomycin.  Blood cultures will be drawn.  The provisional diagnosis of urinary sepsis was discussed with her.                           19:11 EDT, 08/31/24:  I spoke with Dr. Lizarraga, on-call for  Dr. Ramirez.  He would like the patient admitted to the hospitalist.    19:11 EDT, 08/31/24:  Hospitalist has been paged out.  Dr. Pratt returned call, he will admit the patient.            Medical Decision Making      Final diagnoses:   Sepsis due to urinary tract infection       ED Disposition  ED Disposition       None            No follow-up provider specified.       Medication List      No changes were made to your prescriptions during this visit.       No orders to display     Labs Reviewed - No data to display  FL C Arm During Surgery    Result Date: 8/30/2024  Narrative: This procedure was auto-finalized with no dictation required.    Holter Monitor - 72 Hour Up To 15 Days    Result Date: 8/15/2024  Narrative: This result has an attachment that is not available.    Adult Transthoracic Echo Complete W/ Cont if Necessary Per Protocol    Result Date: 8/15/2024  Narrative: This result has an attachment that is not available.     Final diagnoses:   None         ED Medications:  Medications - No data to display    New Medications:     Medication List        ASK your doctor about these medications      atorvastatin 80 MG tablet  Commonly known as: LIPITOR     cephalexin 250 MG capsule  Commonly known as: KEFLEX  Take 1 capsule by mouth Daily for 10 days.     dicyclomine 10 MG capsule  Commonly known as: BENTYL     famotidine 20 MG tablet  Commonly known as: PEPCID     hydrOXYzine 25 MG tablet  Commonly known as: ATARAX     NON FORMULARY     Nurtec 75 MG dispersible tablet  Generic drug: Rimegepant Sulfate     ondansetron ODT 4 MG disintegrating tablet  Commonly known as: ZOFRAN-ODT  Place 1 tablet on the tongue Every 8 (Eight) Hours As Needed for Nausea.     oxyCODONE-acetaminophen 5-325 MG per tablet  Commonly known as: PERCOCET  Take 1 tablet by mouth Every 6 (Six) Hours As Needed (Pain).     phenazopyridine 100 MG tablet  Commonly known as: PYRIDIUM  Take 1 tablet by mouth 3 (Three) Times a Day As Needed  for Bladder Spasms. This medication is for irritation from the procedure or the stent take it as necessary it will make your urine orange or red be cautious and will stain your clothing     PROBIOTIC ACIDOPHILUS PO     vilazodone 10 MG tablet tablet  Commonly known as: VIIBRYD              Stopped Medications:     Medication List        ASK your doctor about these medications      atorvastatin 80 MG tablet  Commonly known as: LIPITOR     cephalexin 250 MG capsule  Commonly known as: KEFLEX  Take 1 capsule by mouth Daily for 10 days.     dicyclomine 10 MG capsule  Commonly known as: BENTYL     famotidine 20 MG tablet  Commonly known as: PEPCID     hydrOXYzine 25 MG tablet  Commonly known as: ATARAX     NON FORMULARY     Nurtec 75 MG dispersible tablet  Generic drug: Rimegepant Sulfate     ondansetron ODT 4 MG disintegrating tablet  Commonly known as: ZOFRAN-ODT  Place 1 tablet on the tongue Every 8 (Eight) Hours As Needed for Nausea.     oxyCODONE-acetaminophen 5-325 MG per tablet  Commonly known as: PERCOCET  Take 1 tablet by mouth Every 6 (Six) Hours As Needed (Pain).     phenazopyridine 100 MG tablet  Commonly known as: PYRIDIUM  Take 1 tablet by mouth 3 (Three) Times a Day As Needed for Bladder Spasms. This medication is for irritation from the procedure or the stent take it as necessary it will make your urine orange or red be cautious and will stain your clothing     PROBIOTIC ACIDOPHILUS PO     vilazodone 10 MG tablet tablet  Commonly known as: Jasvir Vincent MD  08/31/24 5202

## 2024-09-01 VITALS
WEIGHT: 206 LBS | OXYGEN SATURATION: 96 % | HEART RATE: 82 BPM | BODY MASS INDEX: 41.53 KG/M2 | RESPIRATION RATE: 16 BRPM | DIASTOLIC BLOOD PRESSURE: 75 MMHG | SYSTOLIC BLOOD PRESSURE: 121 MMHG | HEIGHT: 59 IN | TEMPERATURE: 97.8 F

## 2024-09-01 PROBLEM — A41.9 SEPSIS DUE TO URINARY TRACT INFECTION: Status: RESOLVED | Noted: 2024-08-31 | Resolved: 2024-09-01

## 2024-09-01 PROBLEM — N39.0 SEPSIS DUE TO URINARY TRACT INFECTION: Status: RESOLVED | Noted: 2024-08-31 | Resolved: 2024-09-01

## 2024-09-01 LAB
ANION GAP SERPL CALCULATED.3IONS-SCNC: 9.6 MMOL/L (ref 5–15)
BASOPHILS # BLD AUTO: 0.02 10*3/MM3 (ref 0–0.2)
BASOPHILS NFR BLD AUTO: 0.1 % (ref 0–1.5)
BUN SERPL-MCNC: 9 MG/DL (ref 6–20)
BUN/CREAT SERPL: 13 (ref 7–25)
CALCIUM SPEC-SCNC: 7.6 MG/DL (ref 8.6–10.5)
CHLORIDE SERPL-SCNC: 107 MMOL/L (ref 98–107)
CO2 SERPL-SCNC: 23.4 MMOL/L (ref 22–29)
CREAT SERPL-MCNC: 0.69 MG/DL (ref 0.57–1)
DEPRECATED RDW RBC AUTO: 39.5 FL (ref 37–54)
EGFRCR SERPLBLD CKD-EPI 2021: 116.2 ML/MIN/1.73
EOSINOPHIL # BLD AUTO: 0.01 10*3/MM3 (ref 0–0.4)
EOSINOPHIL NFR BLD AUTO: 0.1 % (ref 0.3–6.2)
ERYTHROCYTE [DISTWIDTH] IN BLOOD BY AUTOMATED COUNT: 13.1 % (ref 12.3–15.4)
GLUCOSE SERPL-MCNC: 92 MG/DL (ref 65–99)
HCT VFR BLD AUTO: 36 % (ref 34–46.6)
HGB BLD-MCNC: 11.7 G/DL (ref 12–15.9)
IMM GRANULOCYTES # BLD AUTO: 0.09 10*3/MM3 (ref 0–0.05)
IMM GRANULOCYTES NFR BLD AUTO: 0.6 % (ref 0–0.5)
LYMPHOCYTES # BLD AUTO: 2.81 10*3/MM3 (ref 0.7–3.1)
LYMPHOCYTES NFR BLD AUTO: 18.6 % (ref 19.6–45.3)
MCH RBC QN AUTO: 27.1 PG (ref 26.6–33)
MCHC RBC AUTO-ENTMCNC: 32.5 G/DL (ref 31.5–35.7)
MCV RBC AUTO: 83.3 FL (ref 79–97)
MONOCYTES # BLD AUTO: 0.62 10*3/MM3 (ref 0.1–0.9)
MONOCYTES NFR BLD AUTO: 4.1 % (ref 5–12)
NEUTROPHILS NFR BLD AUTO: 11.54 10*3/MM3 (ref 1.7–7)
NEUTROPHILS NFR BLD AUTO: 76.5 % (ref 42.7–76)
NRBC BLD AUTO-RTO: 0 /100 WBC (ref 0–0.2)
PLATELET # BLD AUTO: 252 10*3/MM3 (ref 140–450)
PMV BLD AUTO: 10.4 FL (ref 6–12)
POTASSIUM SERPL-SCNC: 4 MMOL/L (ref 3.5–5.2)
RBC # BLD AUTO: 4.32 10*6/MM3 (ref 3.77–5.28)
SODIUM SERPL-SCNC: 140 MMOL/L (ref 136–145)
WBC NRBC COR # BLD AUTO: 15.09 10*3/MM3 (ref 3.4–10.8)

## 2024-09-01 PROCEDURE — 25810000003 SODIUM CHLORIDE 0.9 % SOLUTION: Performed by: HOSPITALIST

## 2024-09-01 PROCEDURE — G0378 HOSPITAL OBSERVATION PER HR: HCPCS

## 2024-09-01 PROCEDURE — 96361 HYDRATE IV INFUSION ADD-ON: CPT

## 2024-09-01 PROCEDURE — 96376 TX/PRO/DX INJ SAME DRUG ADON: CPT

## 2024-09-01 PROCEDURE — 80048 BASIC METABOLIC PNL TOTAL CA: CPT | Performed by: HOSPITALIST

## 2024-09-01 PROCEDURE — 85025 COMPLETE CBC W/AUTO DIFF WBC: CPT | Performed by: HOSPITALIST

## 2024-09-01 PROCEDURE — 99239 HOSP IP/OBS DSCHRG MGMT >30: CPT | Performed by: STUDENT IN AN ORGANIZED HEALTH CARE EDUCATION/TRAINING PROGRAM

## 2024-09-01 PROCEDURE — 25010000002 CEFTRIAXONE PER 250 MG: Performed by: HOSPITALIST

## 2024-09-01 PROCEDURE — 25010000002 ONDANSETRON PER 1 MG: Performed by: HOSPITALIST

## 2024-09-01 RX ORDER — VILAZODONE HYDROCHLORIDE 10 MG/1
10 TABLET ORAL DAILY
Status: DISCONTINUED | OUTPATIENT
Start: 2024-09-01 | End: 2024-09-01 | Stop reason: HOSPADM

## 2024-09-01 RX ADMIN — OXYCODONE HYDROCHLORIDE AND ACETAMINOPHEN 1 TABLET: 5; 325 TABLET ORAL at 15:40

## 2024-09-01 RX ADMIN — SODIUM CHLORIDE 100 ML/HR: 9 INJECTION, SOLUTION INTRAVENOUS at 03:21

## 2024-09-01 RX ADMIN — VILAZODONE HYDROCHLORIDE 10 MG: 10 TABLET, FILM COATED ORAL at 09:36

## 2024-09-01 RX ADMIN — FAMOTIDINE 20 MG: 20 TABLET ORAL at 08:38

## 2024-09-01 RX ADMIN — Medication 10 ML: at 08:38

## 2024-09-01 RX ADMIN — ONDANSETRON 4 MG: 2 INJECTION INTRAMUSCULAR; INTRAVENOUS at 15:57

## 2024-09-01 RX ADMIN — SODIUM CHLORIDE 100 ML/HR: 9 INJECTION, SOLUTION INTRAVENOUS at 06:01

## 2024-09-01 RX ADMIN — OXYCODONE HYDROCHLORIDE AND ACETAMINOPHEN 1 TABLET: 5; 325 TABLET ORAL at 08:45

## 2024-09-01 RX ADMIN — SODIUM CHLORIDE 100 ML/HR: 9 INJECTION, SOLUTION INTRAVENOUS at 17:46

## 2024-09-01 RX ADMIN — SODIUM CHLORIDE 2000 MG: 9 INJECTION, SOLUTION INTRAVENOUS at 17:49

## 2024-09-01 NOTE — H&P
[facility]      Patient Name: Denise Acosta  : 1990  MRN: [id number]  Primary Care Physician:  Maria Isabel Easton PA  Date of admission: 2024      Subjective      Chief Complaint: Nausea vomiting and flank discomfort    History of Present Illness: Denise Acosta is a 34 y.o. female who presented to [facility] on 2024 complaining of having episode of nausea and vomiting and discomfort involving the left flank area, patient got concerned, she came into the emergency room, patient underwent CT abdomen revealing patent stent with resolution of hydronephrosis.  UA revealed multiple WBCs and RBCs, peripheral leukocytosis of 25K was noted.  Patient noted to have slightly elevated lactic acid of 2.5.  Patient given a history of having left ureteral stone around 3 mm in size, underwent stent placement yesterday.  Today she started having some nausea vomiting with some flank discomfort as described above.  ER spoke to the urology service, they advised admission under hospital service for IV antibiotic and IV fluids with consult to them.      Review of Systems   All other systems reviewed and are negative.       Personal History     Past Medical History:   Diagnosis Date    Anxiety     Blood in urine     Clostridium difficile diarrhea     Elevated cholesterol     GERD (gastroesophageal reflux disease)     History of Clostridioides difficile colitis     IBS (irritable bowel syndrome)     Kidney stones     Migraines     PONV (postoperative nausea and vomiting)     Postural hypotension     Sepsis 2014    Tachycardia     PERIODS OF TACHYCARDIA WITH HOLTER MONITOR- MOST LIKELY RELATED TO KIDNEY STONES. APPOINTMENT WITH DR. SCOTT 24.       Past Surgical History:   Procedure Laterality Date    CHOLECYSTECTOMY      D & C HYSTEROSCOPY ENDOMETRIAL ABLATION N/A 2023    Procedure: HYSTEROSCOPY NOVASURE ENDOMETRIAL ABLATION;  Surgeon:  Troy Castro MD;  Location: [facility];  Service: Obstetrics/Gynecology;  Laterality: N/A;    SALPINGECTOMY Bilateral 7/17/2023    Procedure: SALPINGECTOMY LAPAROSCOPIC;  Surgeon: Troy Castro MD;  Location: [facility];  Service: Obstetrics/Gynecology;  Laterality: Bilateral;    WISDOM TOOTH EXTRACTION         Family History: family history includes Diabetes in her paternal grandfather; Heart disease in her maternal grandfather; Multiple myeloma in her maternal grandmother. Otherwise pertinent FHx was reviewed and not pertinent to current issue.    Social History:  reports that she has never smoked. She has never used smokeless tobacco. She reports that she does not drink alcohol and does not use drugs.    Home Medications:  Prior to Admission Medications       Prescriptions Last Dose Informant Patient Reported? Taking?    atorvastatin (LIPITOR) 80 MG tablet 8/30/2024  Yes Yes    Take 1 tablet by mouth Every Night.    cephalexin (KEFLEX) 250 MG capsule 8/31/2024  No Yes    Take 1 capsule by mouth Daily for 10 days.    dicyclomine (BENTYL) 10 MG capsule 8/30/2024  Yes Yes    Take 1 capsule by mouth As Needed.    famotidine (PEPCID) 20 MG tablet 8/31/2024  Yes Yes    Take 1 tablet by mouth 2 (Two) Times a Day.    hydrOXYzine (ATARAX) 25 MG tablet 8/30/2024  Yes Yes    Take 4 tablets by mouth Every Night.    Lactobacillus (PROBIOTIC ACIDOPHILUS PO) 8/31/2024  Yes Yes    Take  by mouth Daily.    NON FORMULARY Past Week  Yes Yes    Take 30 mg by mouth Daily. L-Methyl Folate    omeprazole (priLOSEC) 20 MG capsule 8/30/2024  Yes Yes    Take 1 capsule by mouth Every Night.    ondansetron ODT (ZOFRAN-ODT) 4 MG disintegrating tablet 8/31/2024  No Yes    Place 1 tablet on the tongue Every 8 (Eight) Hours As Needed for Nausea.    oxyCODONE-acetaminophen (PERCOCET) 5-325 MG per tablet 8/31/2024  No Yes    Take 1 tablet by mouth Every 6 (Six) Hours As Needed (Pain).    phenazopyridine (PYRIDIUM) 100 MG  tablet 8/31/2024  No Yes    Take 1 tablet by mouth 3 (Three) Times a Day As Needed for Bladder Spasms. This medication is for irritation from the procedure or the stent take it as necessary it will make your urine orange or red be cautious and will stain your clothing    Rimegepant Sulfate (Nurtec) 75 MG tablet dispersible tablet 8/30/2024  Yes Yes    Take 1 tablet by mouth Every Other Day.    vilazodone (VIIBRYD) 10 MG tablet tablet 8/31/2024  Yes Yes    Take 1 tablet by mouth Daily.              Allergies:  Allergies   Allergen Reactions    Sulfa Antibiotics Rash    Sulfasalazine Rash and Other (See Comments)    Sulfamethoxazole-Trimethoprim Other (See Comments)    Etodolac Hives     Annotation - 04Nov2019: hives  Annotation - 04Nov2019: hives  Annotation - 04Nov2019: hives      Hydrocodone-Acetaminophen Nausea And Vomiting    Methocarbamol Hives     Annotation - 04Nov2019: hives  Annotation - 04Nov2019: hives  Annotation - 04Nov2019: hives      Sumatriptan Other (See Comments)     Feels like having heart attack. Has chest pain and left arm heaviness       Objective      Vitals:   Temp:  [98.3 °F (36.8 °C)-98.4 °F (36.9 °C)] 98.3 °F (36.8 °C)  Heart Rate:  [75-82] 75  Resp:  [16] 16  BP: (125-139)/(76-89) 131/76    Physical Exam  Vitals and nursing note reviewed.   Constitutional:       General: She is not in acute distress.     Appearance: Normal appearance. She is well-developed. She is not ill-appearing, toxic-appearing or diaphoretic.   HENT:      Head: Normocephalic and atraumatic.      Right Ear: Ear canal and external ear normal.      Left Ear: Ear canal and external ear normal.      Nose: Nose normal. No congestion or rhinorrhea.      Mouth/Throat:      Mouth: Mucous membranes are moist.      Pharynx: No oropharyngeal exudate.   Eyes:      General: No scleral icterus.        Right eye: No discharge.         Left eye: No discharge.      Extraocular Movements: Extraocular movements intact.       Conjunctiva/sclera: Conjunctivae normal.      Pupils: Pupils are equal, round, and reactive to light.   Neck:      Thyroid: No thyromegaly.      Vascular: No carotid bruit or JVD.      Trachea: No tracheal deviation.   Cardiovascular:      Rate and Rhythm: Normal rate and regular rhythm.      Pulses: Normal pulses.      Heart sounds: Normal heart sounds. No murmur heard.     No friction rub. No gallop.   Pulmonary:      Effort: Pulmonary effort is normal. No respiratory distress.      Breath sounds: Normal breath sounds. No stridor. No wheezing, rhonchi or rales.   Chest:      Chest wall: No tenderness.   Abdominal:      General: Bowel sounds are normal. There is no distension.      Palpations: Abdomen is soft. There is no mass.      Tenderness: There is no abdominal tenderness. There is no guarding or rebound.      Hernia: No hernia is present.   Musculoskeletal:         General: No swelling, tenderness, deformity or signs of injury. Normal range of motion.      Cervical back: Normal range of motion and neck supple. No rigidity. No muscular tenderness.      Right lower leg: No edema.      Left lower leg: No edema.   Lymphadenopathy:      Cervical: No cervical adenopathy.   Skin:     General: Skin is warm and dry.      Coloration: Skin is not jaundiced or pale.      Findings: No bruising, erythema or rash.   Neurological:      General: No focal deficit present.      Mental Status: She is alert and oriented to person, place, and time. Mental status is at baseline.      Cranial Nerves: No cranial nerve deficit.      Sensory: No sensory deficit.      Motor: No weakness or abnormal muscle tone.      Coordination: Coordination normal.   Psychiatric:         Mood and Affect: Mood normal.         Behavior: Behavior normal.         Thought Content: Thought content normal.         Judgment: Judgment normal.              Result Review    Result Review:  I have personally reviewed the results from the time of this admission to  8/31/2024 21:01 EDT and agree with these findings:  [x]  Laboratory  [x]  Microbiology  [x]  Radiology  []  EKG/Telemetry   []  Cardiology/Vascular   []  Pathology  []  Old records  []  Other:  Most notable findings include:       Assessment & Plan        Active Hospital Problems:  Active Hospital Problems    Diagnosis     **Sepsis due to urinary tract infection      Plan:     Complicated acute UTI with Left ureteral stone with stent in place, IV fluids, IV Rocephin, follow urine cultures, urology consultation, monitor WBC count and lactic acid.    GERD, continue PPI.    Dyslipidemia, on a statin, monitor LFTs as needed.    DVT prophylaxis with SCDs      VTE Prophylaxis:  Mechanical VTE prophylaxis orders are present.        CODE STATUS:    Code Status (Patient has no pulse and is not breathing): CPR (Attempt to Resuscitate)  Medical Interventions (Patient has pulse or is breathing): Full Support    Admission Status:  I believe this patient meets observation status.    I discussed the patient's findings and my recommendations with patient.    This patient has been examined wearing appropriate Personal Protective Equipment and discussed with hospital infection control department. 08/31/24      Signature:

## 2024-09-01 NOTE — CASE MANAGEMENT/SOCIAL WORK
Case Management Discharge Note      Final Note: Home    Provided Post Acute Provider List?: N/A  Provided Post Acute Provider Quality & Resource List?: N/A    Selected Continued Care - Admitted Since 8/31/2024       Destination    No services have been selected for the patient.                Durable Medical Equipment    No services have been selected for the patient.                Dialysis/Infusion    No services have been selected for the patient.                Home Medical Care    No services have been selected for the patient.                Therapy    No services have been selected for the patient.                Community Resources    No services have been selected for the patient.                Community & DME    No services have been selected for the patient.                         Final Discharge Disposition Code: 01 - home or self-care

## 2024-09-01 NOTE — ED NOTES
Sepsis continuity of care:    The patient needs a repeat lactic acid at 2135.    She will need an additional 1,500ml NS bolus after departure from the ED, accounting for the 500ml from the Vancomycin.    The Vancomycin will be started prior to ED departure.    This information was told directly to the receiving RN, Billie, via telephone at 2000.

## 2024-09-01 NOTE — CONSULTS
Urology Consult  Patient Identification:  Name: Denise Acosta  Age: 34 y.o.  Sex: female  : 1990  MRN: [id number]   Chief Complaint: pain  History of Present Illness:     Uti / pyelo - pt admitted through the ER with left sided flank pain after stone removal and stent placement by dr urias 24, wbc 25, ua +    2. Left 3mm stone - initially treated conservatively after ct 24 and then removed from the distal ureter 24    Multiple medical problems - morbid obesity, gerd, hld, ibs, migraines      Ct 24 left 3mm upj stone     Ct 24 - left stent ingood location w/o hydronephrosis     24 - afebrile , wbc 25--> 18, cr 0.7, no sob, no cp      Problem List:  Active Hospital Problems    Diagnosis  POA    **Sepsis due to urinary tract infection [A41.9, N39.0]  Yes      Resolved Hospital Problems   No resolved problems to display.     Past Medical History:  Past Medical History:   Diagnosis Date    Anxiety     Blood in urine     Clostridium difficile diarrhea     Elevated cholesterol     GERD (gastroesophageal reflux disease)     History of Clostridioides difficile colitis     IBS (irritable bowel syndrome)     Kidney stones     Migraines     PONV (postoperative nausea and vomiting)     Postural hypotension     Sepsis 2014    Tachycardia     PERIODS OF TACHYCARDIA WITH HOLTER MONITOR- MOST LIKELY RELATED TO KIDNEY STONES. APPOINTMENT WITH DR. SCOTT 24.     Past Surgical History:  Past Surgical History:   Procedure Laterality Date    CHOLECYSTECTOMY      D & C HYSTEROSCOPY ENDOMETRIAL ABLATION N/A 2023    Procedure: HYSTEROSCOPY NOVASURE ENDOMETRIAL ABLATION;  Surgeon: Troy Castro MD;  Location: [facility];  Service: Obstetrics/Gynecology;  Laterality: N/A;    SALPINGECTOMY Bilateral 2023    Procedure: SALPINGECTOMY LAPAROSCOPIC;  Surgeon: Troy Castro MD;  Location: [facility] OR;  Service: Obstetrics/Gynecology;  Laterality: Bilateral;     WISDOM TOOTH EXTRACTION        Home Meds:  Medications Prior to Admission   Medication Sig Dispense Refill Last Dose    atorvastatin (LIPITOR) 80 MG tablet Take 1 tablet by mouth Every Night.   8/30/2024    cephalexin (KEFLEX) 250 MG capsule Take 1 capsule by mouth Daily for 10 days. 10 capsule 0 8/31/2024 at 0830    dicyclomine (BENTYL) 10 MG capsule Take 1 capsule by mouth As Needed.   8/30/2024    famotidine (PEPCID) 20 MG tablet Take 1 tablet by mouth 2 (Two) Times a Day.   8/31/2024    hydrOXYzine (ATARAX) 25 MG tablet Take 4 tablets by mouth Every Night.   8/30/2024    Lactobacillus (PROBIOTIC ACIDOPHILUS PO) Take  by mouth Daily.   8/31/2024    NON FORMULARY Take 30 mg by mouth Daily. L-Methyl Folate   Past Week    omeprazole (priLOSEC) 20 MG capsule Take 1 capsule by mouth Every Night.   8/30/2024    ondansetron ODT (ZOFRAN-ODT) 4 MG disintegrating tablet Place 1 tablet on the tongue Every 8 (Eight) Hours As Needed for Nausea. 10 tablet 1 8/31/2024    oxyCODONE-acetaminophen (PERCOCET) 5-325 MG per tablet Take 1 tablet by mouth Every 6 (Six) Hours As Needed (Pain). 20 tablet 0 8/31/2024 at 1300    phenazopyridine (PYRIDIUM) 100 MG tablet Take 1 tablet by mouth 3 (Three) Times a Day As Needed for Bladder Spasms. This medication is for irritation from the procedure or the stent take it as necessary it will make your urine orange or red be cautious and will stain your clothing 21 tablet 1 8/31/2024 at 1330    Rimegepant Sulfate (Nurtec) 75 MG tablet dispersible tablet Take 1 tablet by mouth Every Other Day.   8/30/2024    vilazodone (VIIBRYD) 10 MG tablet tablet Take 1 tablet by mouth Daily.   8/31/2024     Current Meds:     Current Facility-Administered Medications:     atorvastatin (LIPITOR) tablet 80 mg, 80 mg, Oral, Nightly, Kerry Pratt MD, 80 mg at 08/31/24 2130    sennosides-docusate (PERICOLACE) 8.6-50 MG per tablet 2 tablet, 2 tablet, Oral, BID PRN **AND** polyethylene glycol (MIRALAX)  packet 17 g, 17 g, Oral, Daily PRN **AND** bisacodyl (DULCOLAX) EC tablet 5 mg, 5 mg, Oral, Daily PRN **AND** bisacodyl (DULCOLAX) suppository 10 mg, 10 mg, Rectal, Daily PRN, Kerry Pratt MD    Calcium Replacement - Follow Nurse / BPA Driven Protocol, , Does not apply, PRN, Kerry Pratt MD    cefTRIAXone (ROCEPHIN) 2,000 mg in sodium chloride 0.9 % 100 mL IVPB-VTB, 2,000 mg, Intravenous, Q24H, Kerry Pratt MD    famotidine (PEPCID) tablet 20 mg, 20 mg, Oral, BID, Kerry Pratt MD, 20 mg at 08/31/24 2130    hydrOXYzine (ATARAX) tablet 100 mg, 100 mg, Oral, Nightly, Kerry Pratt MD, 100 mg at 08/31/24 2130    Magnesium Standard Dose Replacement - Follow Nurse / BPA Driven Protocol, , Does not apply, PRN, Kerry Pratt MD    morphine injection 2 mg, 2 mg, Intravenous, Q4H PRN, Kerry Pratt MD    ondansetron (ZOFRAN) injection 4 mg, 4 mg, Intravenous, Q6H PRN, Kerry Pratt MD    oxyCODONE-acetaminophen (PERCOCET) 5-325 MG per tablet 1 tablet, 1 tablet, Oral, Q6H PRN, Kerry Pratt MD, 1 tablet at 08/31/24 2225    phenazopyridine (PYRIDIUM) tablet 100 mg, 100 mg, Oral, TID PRN, Kerry Pratt MD, 100 mg at 08/31/24 2225    Phosphorus Replacement - Follow Nurse / BPA Driven Protocol, , Does not apply, PRNSanta Salgram, MD    Potassium Replacement - Follow Nurse / BPA Driven Protocol, , Does not apply, PRN, Kerry Pratt MD    sodium chloride 0.9 % flush 10 mL, 10 mL, Intravenous, Q12H, Kerry Pratt MD    sodium chloride 0.9 % flush 10 mL, 10 mL, Intravenous, PRN, Kerry Pratt MD    sodium chloride 0.9 % infusion  - ADS Override Pull, , , ,     sodium chloride 0.9 % infusion 40 mL, 40 mL, Intravenous, PRN, Kerry Pratt MD    sodium chloride 0.9 % infusion, 100 mL/hr, Intravenous, Continuous, Kerry Pratt MD, Last Rate: 100 mL/hr at 09/01/24 0601, 100 mL/hr at 09/01/24 0601    vancomycin  (VANCOCIN) 1 g injection  - ADS Override Pull, , , ,     vancomycin 750 MG injection  - ADS Override Pull, , , ,   Allergies:  Allergies   Allergen Reactions    Sulfa Antibiotics Rash    Sulfasalazine Rash and Other (See Comments)    Sulfamethoxazole-Trimethoprim Other (See Comments)    Etodolac Hives     Annotation - 2019: hives  Annotation - 2019: hives  Annotation - 2019: hives      Hydrocodone-Acetaminophen Nausea And Vomiting    Methocarbamol Hives     Annotation - 2019: hives  Annotation - 2019: hives  Annotation - 2019: hives      Sumatriptan Other (See Comments)     Feels like having heart attack. Has chest pain and left arm heaviness     Immunizations:  Immunization History   Administered Date(s) Administered    COVID-19 (MODERNA) 1st,2nd,3rd Dose Monovalent 2020, 2021, 2021    DTaP 1989, 1989, 1990, 1991, 1994    Fluad Quad 65+ 10/10/2022    HPV Quadrivalent 2007    Hepatitis B 2 Dose Vaccine Heplisav-B 2023, 2023    Hepatitis B Adult/Adolescent IM 2003, 2003, 03/10/2004    Hib (PRP-OMP) 10/10/1990, 1992    IPV 1989, 1989, 1991, 1994    MMR 10/10/1990, 2000    Meningococcal MCV4P (Menactra) 2007    Td (TDVAX) 03/10/2004    influenza Split 10/01/2018     Social History:   Social History     Tobacco Use    Smoking status: Never    Smokeless tobacco: Never   Substance Use Topics    Alcohol use: No      Family History:  Family History   Problem Relation Age of Onset    Multiple myeloma Maternal Grandmother     Heart disease Maternal Grandfather     Diabetes Paternal Grandfather     Malig Hyperthermia Neg Hx       Review of Systems  negative 12 point system review except:  Objective:  tMax 24 hrs: Temp (24hrs), Av.3 °F (36.8 °C), Min:98.1 °F (36.7 °C), Max:98.5 °F (36.9 °C)    Vitals Ranges:   Temp:  [98.1 °F (36.7 °C)-98.5 °F (36.9 °C)] 98.5 °F (36.9  "°C)  Heart Rate:  [75-82] 78  Resp:  [16] 16  BP: (124-139)/(76-89) 135/81  Intake and Output Last 3 Shifts:   No intake/output data recorded.  Exam:  /81 (BP Location: Left arm, Patient Position: Lying)   Pulse 78   Temp 98.5 °F (36.9 °C) (Oral)   Resp 16   Ht 151.1 cm (59.49\")   Wt 93.4 kg (206 lb)   LMP 08/07/2024 (Approximate) Comment: VERY LIGHT PERIODS  SpO2 97%   BMI 40.93 kg/m²       General Appearance: Alert, cooperative, no distress, appears stated age   Head: Normocephalic, without obvious abnormality, atraumatic   ENT: Normal hearing, external inspection, ears and nose   Eyes: Normal pupils/irises, external inspection, conjunctivae, eyelids   Back: No CVA tenderness   Respiratory: Normal effort, palpation, auscultation   CV: Normal auscultation, carotid pulses, no edema   Abdomen: No hernia, soft, nontender, no masses   :    Musculoskeletal: Normal extremities, nails, digits   Skin: Normal skin color, texture, no rashes or lesion   Neurologic/psych: Normal orientation, mood, affect           Data Review:  CBC:   Results from last 7 days   Lab Units 08/31/24  2145   WBC 10*3/mm3 18.84*   RBC 10*6/mm3 4.34     BMP:   Results from last 7 days   Lab Units 08/31/24  2145   GLUCOSE mg/dL 118*   CO2 mmol/L 21.4*   BUN mg/dL 10   CREATININE mg/dL 0.70   CALCIUM mg/dL 7.5*      Assessment:    Sepsis due to urinary tract infection          Plan:  No urologic intervention indicated     Ivf  Iv abx  Pt to follow up with dr urias for stent removal after discharge   Please call if any questions       Lei Lizarraga MD  9/1/2024      "

## 2024-09-01 NOTE — DISCHARGE SUMMARY
"Denise Acosta  1990  [id number]    Hospitalists Discharge Summary    Date of Admission: 8/31/2024  Date of Discharge:  9/1/2024    History of Present Illness from [facility] on admit: \"Denise Acosta is a 34 y.o. female who presented to [facility] on 8/31/2024 complaining of having episode of nausea and vomiting and discomfort involving the left flank area, patient got concerned, she came into the emergency room, patient underwent CT abdomen revealing patent stent with resolution of hydronephrosis.  UA revealed multiple WBCs and RBCs, peripheral leukocytosis of 25K was noted.  Patient noted to have slightly elevated lactic acid of 2.5.  Patient given a history of having left ureteral stone around 3 mm in size, underwent stent placement yesterday.  Today she started having some nausea vomiting with some flank discomfort as described above.  ER spoke to the urology service, they advised admission under hospital service for IV antibiotic and IV fluids with consult to them.\"    Primary Discharge diagnoses: Sepsis due to UTI    Secondary Discharge Diagnoses: n/a    Hospital Course Summary: Patient was admitted for nausea, vomiting, flank pain following left ureter stent placement the day before.  CT showed resolution of hydronephrosis, but UA identified UTI.  Urology was consulted.  Patient was treated with antibiotics and IV fluids with significant improvement in symptoms.  On 9/1/2024 patient's condition had improved and she was deemed stable for discharge.  She was advised to take all medications as prescribed, follow up with PCP within 1 week, and follow-up with urology as scheduled. If there are any issues patient should contact PCP and/or return to the ED for follow up. Patient was agreeable to the plan and subsequently discharged at this time.     PCP  Patient Care Team:  Maria Isabel Easton PA as PCP - General (Physician Assistant)    Consults:   Consults       Date and Time Order Name Status " Description    8/31/2024  9:00 PM Inpatient Urology Consult Completed             Operations and Procedures Performed:       CT Abdomen Pelvis Without Contrast    Result Date: 8/31/2024  Narrative: CT ABDOMEN PELVIS WO CONTRAST Date of Exam: 8/31/2024 6:01 PM EDT Indication: Right flank pain. Comparison: 7/20/2024 CT Technique: Axial CT images were obtained of the abdomen and pelvis without the administration of contrast. Sagittal and coronal reconstructions were performed.  Automated exposure control and iterative reconstruction methods were used. Findings: The included lung bases show no acute abnormality. There is no suspicious hepatic lesion. Cholecystectomy. No significant biliary ductal dilation. The spleen and pancreas are within normal limits unremarkable bilateral adrenal glands. Interval placement of a left ureteral stent which appears in appropriate position. There is no evidence of significant left hydronephrosis. Unchanged focal nonobstructing renal stone on the left as seen on series 3 image 63. No evidence of right-sided hydronephrosis or nephrolithiasis. No evidence of bowel obstruction. Normal appendix. No suspicious lymphadenopathy. No abdominal aortic aneurysm. Urinary bladder is decompressed. Uterus and bilateral ovaries are within normal limits.. Abdominal and pelvic wall soft tissues show no acute abnormality. No acute fracture or suspicious osseous lesion.     Impression: Impression: Interval placement of a left ureteral stent without evidence of complication. Interval resolution of left-sided hydronephrosis. No evidence of right-sided hydronephrosis. Nonobstructing left renal stone as indexed above. No evidence of right-sided nephrolithiasis. Electronically Signed: Zackary Peterson MD  8/31/2024 7:02 PM EDT  Workstation ID: [id number]    FL C Arm During Surgery    Result Date: 8/30/2024  Narrative: This procedure was auto-finalized with no dictation required.    Holter Monitor - 72 Hour Up To 15  Days    Result Date: 8/15/2024  Narrative: This result has an attachment that is not available.    Adult Transthoracic Echo Complete W/ Cont if Necessary Per Protocol    Result Date: 8/15/2024  Narrative: This result has an attachment that is not available.     Allergies:  is allergic to sulfa antibiotics, sulfasalazine, sulfamethoxazole-trimethoprim, etodolac, hydrocodone-acetaminophen, methocarbamol, and sumatriptan.    Sonu  reviewed    Discharge Medications:     Discharge Medications        Continue These Medications        Instructions Start Date   atorvastatin 80 MG tablet  Commonly known as: LIPITOR   1 tablet, Oral, Nightly      cephalexin 250 MG capsule  Commonly known as: KEFLEX   250 mg, Oral, Daily      dicyclomine 10 MG capsule  Commonly known as: BENTYL   1 capsule, Oral, As Needed      famotidine 20 MG tablet  Commonly known as: PEPCID   20 mg, Oral, 2 Times Daily      hydrOXYzine 25 MG tablet  Commonly known as: ATARAX   100 mg, Oral, Nightly      NON FORMULARY   30 mg, Oral, Daily, L-Methyl Folate      Nurtec 75 MG dispersible tablet  Generic drug: Rimegepant Sulfate   Take 1 tablet by mouth Every Other Day.      omeprazole 20 MG capsule  Commonly known as: priLOSEC   20 mg, Oral, Nightly      ondansetron ODT 4 MG disintegrating tablet  Commonly known as: ZOFRAN-ODT   4 mg, Translingual, Every 8 Hours PRN      oxyCODONE-acetaminophen 5-325 MG per tablet  Commonly known as: PERCOCET   1 tablet, Oral, Every 6 Hours PRN      phenazopyridine 100 MG tablet  Commonly known as: PYRIDIUM   100 mg, Oral, 3 Times Daily PRN, This medication is for irritation from the procedure or the stent take it as necessary it will make your urine orange or red be cautious and will stain your clothing      PROBIOTIC ACIDOPHILUS PO   Oral, Daily      vilazodone 10 MG tablet tablet  Commonly known as: VIIBRYD   10 mg, Oral, Daily               Last Lab Results:   Lab Results (most recent)       Procedure Component Value  Units Date/Time    Basic Metabolic Panel [[id number]]  (Abnormal) Collected: 09/01/24 0617    Specimen: Blood Updated: 09/01/24 0637     Glucose 92 mg/dL      BUN 9 mg/dL      Creatinine 0.69 mg/dL      Sodium 140 mmol/L      Potassium 4.0 mmol/L      Chloride 107 mmol/L      CO2 23.4 mmol/L      Calcium 7.6 mg/dL      BUN/Creatinine Ratio 13.0     Anion Gap 9.6 mmol/L      eGFR 116.2 mL/min/1.73     Narrative:      GFR Normal >60  Chronic Kidney Disease <60  Kidney Failure <15      CBC & Differential [[id number]]  (Abnormal) Collected: 09/01/24 0617    Specimen: Blood Updated: 09/01/24 0621    Narrative:      The following orders were created for panel order CBC & Differential.  Procedure                               Abnormality         Status                     ---------                               -----------         ------                     CBC Auto Differential[[id number]]        Abnormal            Final result                 Please view results for these tests on the individual orders.    CBC Auto Differential [[id number]]  (Abnormal) Collected: 09/01/24 0617    Specimen: Blood Updated: 09/01/24 0621     WBC 15.09 10*3/mm3      RBC 4.32 10*6/mm3      Hemoglobin 11.7 g/dL      Hematocrit 36.0 %      MCV 83.3 fL      MCH 27.1 pg      MCHC 32.5 g/dL      RDW 13.1 %      RDW-SD 39.5 fl      MPV 10.4 fL      Platelets 252 10*3/mm3      Neutrophil % 76.5 %      Lymphocyte % 18.6 %      Monocyte % 4.1 %      Eosinophil % 0.1 %      Basophil % 0.1 %      Immature Grans % 0.6 %      Neutrophils, Absolute 11.54 10*3/mm3      Lymphocytes, Absolute 2.81 10*3/mm3      Monocytes, Absolute 0.62 10*3/mm3      Eosinophils, Absolute 0.01 10*3/mm3      Basophils, Absolute 0.02 10*3/mm3      Immature Grans, Absolute 0.09 10*3/mm3      nRBC 0.0 /100 WBC     STAT Lactic Acid, Reflex [[id number]]  (Abnormal) Collected: 08/31/24 2146    Specimen: Blood Updated: 08/31/24 2210     Lactate 2.1 mmol/L     Basic Metabolic Panel  [[id number]]  (Abnormal) Collected: 08/31/24 2145    Specimen: Blood Updated: 08/31/24 2207     Glucose 118 mg/dL      BUN 10 mg/dL      Creatinine 0.70 mg/dL      Sodium 139 mmol/L      Potassium 3.9 mmol/L      Chloride 107 mmol/L      CO2 21.4 mmol/L      Calcium 7.5 mg/dL      BUN/Creatinine Ratio 14.3     Anion Gap 10.6 mmol/L      eGFR 115.8 mL/min/1.73     Narrative:      GFR Normal >60  Chronic Kidney Disease <60  Kidney Failure <15      CBC & Differential [[id number]]  (Abnormal) Collected: 08/31/24 2145    Specimen: Blood Updated: 08/31/24 2151    Narrative:      The following orders were created for panel order CBC & Differential.  Procedure                               Abnormality         Status                     ---------                               -----------         ------                     CBC Auto Differential[[id number]]        Abnormal            Final result                 Please view results for these tests on the individual orders.    CBC Auto Differential [[id number]]  (Abnormal) Collected: 08/31/24 2145    Specimen: Blood Updated: 08/31/24 2151     WBC 18.84 10*3/mm3      RBC 4.34 10*6/mm3      Hemoglobin 11.9 g/dL      Hematocrit 36.9 %      MCV 85.0 fL      MCH 27.4 pg      MCHC 32.2 g/dL      RDW 12.9 %      RDW-SD 39.7 fl      MPV 10.6 fL      Platelets 256 10*3/mm3      Neutrophil % 86.0 %      Lymphocyte % 9.6 %      Monocyte % 3.3 %      Eosinophil % 0.4 %      Basophil % 0.2 %      Immature Grans % 0.5 %      Neutrophils, Absolute 16.20 10*3/mm3      Lymphocytes, Absolute 1.81 10*3/mm3      Monocytes, Absolute 0.63 10*3/mm3      Eosinophils, Absolute 0.07 10*3/mm3      Basophils, Absolute 0.03 10*3/mm3      Immature Grans, Absolute 0.10 10*3/mm3      nRBC 0.0 /100 WBC     Blood Culture - Blood, Arm, Left [[id number]] Collected: 08/31/24 1948    Specimen: Blood from Arm, Left Updated: 08/31/24 2014    Blood Culture - Blood, Arm, Right [[id number]] Collected: 08/31/24 1910     Specimen: Blood from Arm, Right Updated: 08/31/24 1925    Lactic Acid, Plasma [[id number]]  (Abnormal) Collected: 08/31/24 1835    Specimen: Blood Updated: 08/31/24 1855     Lactate 2.5 mmol/L     Comprehensive Metabolic Panel [[id number]]  (Abnormal) Collected: 08/31/24 1759    Specimen: Blood Updated: 08/31/24 1828     Glucose 132 mg/dL      BUN 11 mg/dL      Creatinine 0.81 mg/dL      Sodium 138 mmol/L      Potassium 3.8 mmol/L      Chloride 102 mmol/L      CO2 22.9 mmol/L      Calcium 9.0 mg/dL      Total Protein 7.8 g/dL      Albumin 4.4 g/dL      ALT (SGPT) 19 U/L      AST (SGOT) 20 U/L      Alkaline Phosphatase 106 U/L      Total Bilirubin 0.2 mg/dL      Globulin 3.4 gm/dL      A/G Ratio 1.3 g/dL      BUN/Creatinine Ratio 13.6     Anion Gap 13.1 mmol/L      eGFR 97.2 mL/min/1.73     Narrative:      GFR Normal >60  Chronic Kidney Disease <60  Kidney Failure <15      Urine Culture - Urine, Urine, Clean Catch [[id number]] Collected: 08/31/24 1806    Specimen: Urine, Clean Catch Updated: 08/31/24 1827    Urinalysis, Microscopic Only - Urine, Clean Catch [[id number]]  (Abnormal) Collected: 08/31/24 1806    Specimen: Urine, Clean Catch Updated: 08/31/24 1819     RBC, UA Too Numerous to Count /HPF      WBC, UA None Seen /HPF      Bacteria, UA None Seen /HPF      Squamous Epithelial Cells, UA None Seen /HPF      Hyaline Casts, UA None Seen /LPF      Methodology Manual Light Microscopy    Urinalysis With Microscopic If Indicated (No Culture) - Urine, Clean Catch [[id number]]  (Abnormal) Collected: 08/31/24 1806    Specimen: Urine, Clean Catch Updated: 08/31/24 1818     Color, UA Orange     Comment: Any Substance that causes an abnormal urine color can alter the accuracy of the chemical reactions.        Appearance, UA Slightly Cloudy     pH, UA <=5.0     Specific Gravity, UA 1.031     Comment: Result obtained by Refractometer        Glucose,  mg/dL (Trace)     Ketones, UA Trace     Bilirubin, UA Small (1+)     Blood,  UA Large (3+)     Protein, UA >=300 mg/dL (3+)     Leuk Esterase, UA Negative     Nitrite, UA Positive     Urobilinogen, UA 2.0 E.U./dL          Imaging Results (Most Recent)       Procedure Component Value Units Date/Time    CT Abdomen Pelvis Without Contrast [[id number]] Collected: 08/31/24 1854     Updated: 08/31/24 1911    Narrative:      CT ABDOMEN PELVIS WO CONTRAST    Date of Exam: 8/31/2024 6:01 PM EDT    Indication: Right flank pain.    Comparison: 7/20/2024 CT    Technique: Axial CT images were obtained of the abdomen and pelvis without the administration of contrast. Sagittal and coronal reconstructions were performed.  Automated exposure control and iterative reconstruction methods were used.      Findings:  The included lung bases show no acute abnormality.    There is no suspicious hepatic lesion. Cholecystectomy. No significant biliary ductal dilation.    The spleen and pancreas are within normal limits unremarkable bilateral adrenal glands.    Interval placement of a left ureteral stent which appears in appropriate position. There is no evidence of significant left hydronephrosis. Unchanged focal nonobstructing renal stone on the left as seen on series 3 image 63. No evidence of right-sided   hydronephrosis or nephrolithiasis.    No evidence of bowel obstruction. Normal appendix.    No suspicious lymphadenopathy. No abdominal aortic aneurysm.    Urinary bladder is decompressed. Uterus and bilateral ovaries are within normal limits..    Abdominal and pelvic wall soft tissues show no acute abnormality. No acute fracture or suspicious osseous lesion.      Impression:      Impression:  Interval placement of a left ureteral stent without evidence of complication. Interval resolution of left-sided hydronephrosis. No evidence of right-sided hydronephrosis.    Nonobstructing left renal stone as indexed above. No evidence of right-sided nephrolithiasis.            Electronically Signed: Zackary Peterson MD     8/31/2024 7:02 PM EDT    Workstation ID: [id number]            PROCEDURES      Condition on Discharge:  Stable, Improved.       Vital Signs  Temp:  [97.8 °F (36.6 °C)-98.5 °F (36.9 °C)] 97.8 °F (36.6 °C)  Heart Rate:  [69-82] 82  Resp:  [16-18] 16  BP: (115-135)/(53-81) 121/75    Exam at Discharge  General: Patient is awake and alert. Well developed and well nourished. No acute distress noted.   HENT: Head is atraumatic, normocephalic. Hearing is grossly intact. Trachea is midline.   Eyes: Vision is grossly intact. Pupils appear equal and round.   Cardiovascular: Heart has regular rate and rhythm with no murmurs, rubs or gallops noted.   Respiratory: Lungs are clear to ausculation without wheezes, rhonchi or rales.   Abdominal/GI: Soft, left lower abdominal tenderness to palpation, bowel sounds present. No guarding present.  Left flank tenderness to palpation.  Extremities: No peripheral edema noted.   Musculoskeletal: Spontaneous movement of bilateral upper and lower extremities against gravity noted. No signs of injury or deformity noted.   Skin: Warm and dry.   Psych: Mood and affect are appropriate. Cooperative with exam.   Neuro: No facial asymmetry noted. No focal deficits noted, hearing and vision are grossly intact.     Discharge Disposition  To home    Visiting Nurse:    N/A    Home PT/OT:  N/A    Home Safety Evaluation:  N/A    DME  N/A    Discharge Diet:      Dietary Orders (From admission, onward)       Start     Ordered    08/31/24 2100  Diet: Cardiac; Healthy Heart (2-3 Na+); Fluid Consistency: Thin (IDDSI 0)  Diet Effective Now        References:    Diet Order [facility]   Question Answer Comment   Diets: Cardiac    Cardiac Diet: Healthy Heart (2-3 Na+)    Fluid Consistency: Thin (IDDSI 0)        08/31/24 2100                    Activity at Discharge:  As tolerated    Pre-discharge education  None      Follow-up Appointments  Future Appointments   Date Time Provider Department Center   9/16/2024  2:45 PM  Olaf Heredia MD [facility]   11/6/2024  2:00 PM Troy Castro MD [facility]     Additional Instructions for the Follow-ups that You Need to Schedule       Discharge Follow-up with PCP   As directed       Currently Documented PCP:    Maria Isabel Easton PA    PCP Phone Number:    [phone]     Follow Up Details: Within 1 week        Discharge Follow-up with Specialty: Urology   As directed      Specialty: Urology   Follow Up Details: as scheduled       Additional information on Labs and Follow-ups:    FOLLOW  UP WITH UROLOGY ON TUESDAY 9/3/24           Test Results Pending at Discharge  Pending Labs       Order Current Status    Blood Culture - Blood, Arm, Left In process    Urine Culture - Urine, Urine, Clean Catch In process    Blood Culture - Blood, Arm, Right Preliminary result            Discharge over 30 min secondary to discussions with patient, medication reconciliation, coordination of care, documenting in medical record.    At [facility], we believe that sharing information builds trust and better relationships. You are receiving this note because you recently visited [facility]. It is possible you will see health information before a provider has talked with you about it. This kind of information can be easy to misunderstand. To help you fully understand what it means for your health, we urge you to discuss this note with your provider.    Manuel Grady MD  09/01/24  20:05 EDT

## 2024-09-01 NOTE — NURSING NOTE
VSS; IV ABX; PT DC - FOLLOW UP WITH UROLOGY TUESDAY FOR FRIDAY 9/6 APPOINTMENT   HOME WITH     [facility]

## 2024-09-01 NOTE — CASE MANAGEMENT/SOCIAL WORK
Continued Stay Note  KRIS Low     Patient Name: Denise Acosta  MRN: [id number]  Today's Date: 9/1/2024    Admit Date: 8/31/2024    Plan: Home w/family, no needs   Discharge Plan       Row Name 09/01/24 1316       Plan    Plan Home w/family, no needs    Plan Comments Met with pt and her spouse, introduced self, role and discussed dc plans, needs.  pt lives with her spouse in a 1 story home w/basement.  Pt is IADL's and uses no DME. She is working full time and will need a work excuse.  Her pharamcy is [facility] in [place] and she has no issues with medication copays.  She denies being abused.  pt has never used HH or been to IP rehab.  Her plan is to return home.  No needs identified.  Will follow. Lise Miller                   Discharge Codes    No documentation.                       Lise Sanchez

## 2024-09-01 NOTE — ED NOTES
"Nursing report ED to floor  Denise Acosta  34 y.o.  female    HPI :   Chief Complaint   Patient presents with    Flank Pain     Left sided flank pain that radiates into her abd, patient states she had stent put in yesterday. States pain has been there since.        Admitting doctor:   Kerry Pratt MD    Admitting diagnosis:   The encounter diagnosis was Sepsis due to urinary tract infection.    Code status:   Current Code Status       Date Active Code Status Order ID Comments User Context       Not on file            Allergies:   Sulfa antibiotics, Sulfasalazine, Sulfamethoxazole-trimethoprim, Etodolac, Hydrocodone-acetaminophen, Methocarbamol, and Sumatriptan    Isolation:   No active isolations    Intake and Output  No intake or output data in the 24 hours ending 08/31/24 2001    Weight:       08/31/24  1741   Weight: 93.4 kg (206 lb)       Most recent vitals:   Vitals:    08/31/24 1740 08/31/24 1741 08/31/24 1831 08/31/24 1931   BP: 139/89  125/89 139/89   BP Location:    Left arm   Patient Position:    Sitting   Pulse:  82 81 77   Resp:  16  16   Temp: 98.4 °F (36.9 °C)      TempSrc: Oral      SpO2:  97% 94% 99%   Weight:  93.4 kg (206 lb)     Height:  151.1 cm (59.49\")         Active LDAs/IV Access:   Lines, Drains & Airways       Active LDAs       Name Placement date Placement time Site Days    Peripheral IV 08/31/24 1813 Right Forearm 08/31/24  1813  Forearm  less than 1                    Labs (abnormal labs have a star):   Labs Reviewed   COMPREHENSIVE METABOLIC PANEL - Abnormal; Notable for the following components:       Result Value    Glucose 132 (*)     All other components within normal limits    Narrative:     GFR Normal >60  Chronic Kidney Disease <60  Kidney Failure <15     URINALYSIS W/ MICROSCOPIC IF INDICATED (NO CULTURE) - Abnormal; Notable for the following components:    Color, UA Orange (*)     Appearance, UA Slightly Cloudy (*)     Specific Gravity, UA 1.031 (*)     Glucose, UA " 100 mg/dL (Trace) (*)     Ketones, UA Trace (*)     Bilirubin, UA Small (1+) (*)     Blood, UA Large (3+) (*)     Protein, UA >=300 mg/dL (3+) (*)     Nitrite, UA Positive (*)     Urobilinogen, UA 2.0 E.U./dL (*)     All other components within normal limits   CBC WITH AUTO DIFFERENTIAL - Abnormal; Notable for the following components:    WBC 25.42 (*)     Neutrophil % 88.4 (*)     Lymphocyte % 6.3 (*)     Monocyte % 4.3 (*)     Eosinophil % 0.2 (*)     Immature Grans % 0.6 (*)     Neutrophils, Absolute 22.49 (*)     Monocytes, Absolute 1.09 (*)     Immature Grans, Absolute 0.16 (*)     All other components within normal limits   URINALYSIS, MICROSCOPIC ONLY - Abnormal; Notable for the following components:    RBC, UA Too Numerous to Count (*)     All other components within normal limits   LACTIC ACID, PLASMA - Abnormal; Notable for the following components:    Lactate 2.5 (*)     All other components within normal limits   URINE CULTURE   BLOOD CULTURE   BLOOD CULTURE   LACTIC ACID, REFLEX   CBC AND DIFFERENTIAL    Narrative:     The following orders were created for panel order CBC & Differential.  Procedure                               Abnormality         Status                     ---------                               -----------         ------                     CBC Auto Differential[[id number]]        Abnormal            Final result                 Please view results for these tests on the individual orders.       Results       Procedure Component Value Ref Range Date/Time    Blood Culture - Blood, [[id number]] Collected: 08/31/24 1948    Order Status: Sent Specimen: Blood     Blood Culture - Blood, Arm, Right [[id number]] Collected: 08/31/24 1910    Order Status: Sent Specimen: Blood from Arm, Right Updated: 08/31/24 1925    Lactic Acid, Plasma [[id number]]  (Abnormal) Collected: 08/31/24 1835    Order Status: Completed Specimen: Blood Updated: 08/31/24 1855     Lactate 2.5 0.5 - 2.0 mmol/L     STAT Lactic  Acid, Reflex [[id number]]     Order Status: Sent Specimen: Blood     Comprehensive Metabolic Panel [[id number]]  (Abnormal) Collected: 08/31/24 1759    Order Status: Completed Specimen: Blood Updated: 08/31/24 1828     Glucose 132 65 - 99 mg/dL      BUN 11 6 - 20 mg/dL      Creatinine 0.81 0.57 - 1.00 mg/dL      Sodium 138 136 - 145 mmol/L      Potassium 3.8 3.5 - 5.2 mmol/L      Chloride 102 98 - 107 mmol/L      CO2 22.9 22.0 - 29.0 mmol/L      Calcium 9.0 8.6 - 10.5 mg/dL      Total Protein 7.8 6.0 - 8.5 g/dL      Albumin 4.4 3.5 - 5.2 g/dL      ALT (SGPT) 19 1 - 33 U/L      AST (SGOT) 20 1 - 32 U/L      Alkaline Phosphatase 106 39 - 117 U/L      Total Bilirubin 0.2 0.0 - 1.2 mg/dL      Globulin 3.4 gm/dL      A/G Ratio 1.3 g/dL      BUN/Creatinine Ratio 13.6 7.0 - 25.0      Anion Gap 13.1 5.0 - 15.0 mmol/L      eGFR 97.2 >60.0 mL/min/1.73     Narrative:      GFR Normal >60  Chronic Kidney Disease <60  Kidney Failure <15      Urine Culture - Urine, Urine, Clean Catch [[id number]] Collected: 08/31/24 1806    Order Status: Sent Specimen: Urine, Clean Catch Updated: 08/31/24 1827    Urinalysis With Culture If Indicated - Urine, Clean Catch [[id number]] Collected: 08/31/24 1806    Order Status: Canceled Specimen: Urine, Clean Catch Updated: 08/31/24 1827    Urinalysis, Microscopic Only - Urine, Clean Catch [[id number]]  (Abnormal) Collected: 08/31/24 1806    Order Status: Completed Specimen: Urine, Clean Catch Updated: 08/31/24 1819     RBC, UA Too Numerous to Count None Seen, 0-2 /HPF      WBC, UA None Seen None Seen, 0-2 /HPF      Bacteria, UA None Seen None Seen /HPF      Squamous Epithelial Cells, UA None Seen None Seen, 0-2 /HPF      Hyaline Casts, UA None Seen None Seen /LPF      Methodology Manual Light Microscopy    Urinalysis With Microscopic If Indicated (No Culture) - Urine, Clean Catch [[id number]]  (Abnormal) Collected: 08/31/24 1806    Order Status: Completed Specimen: Urine, Clean Catch Updated: 08/31/24 1818      Color, UA Orange Yellow, Straw      Comment: Any Substance that causes an abnormal urine color can alter the accuracy of the chemical reactions.        Appearance, UA Slightly Cloudy Clear      pH, UA <=5.0 4.5 - 8.0      Specific Gravity, UA 1.031 1.003 - 1.030      Comment: Result obtained by Refractometer        Glucose,  mg/dL (Trace) Negative      Ketones, UA Trace Negative      Bilirubin, UA Small (1+) Negative      Blood, UA Large (3+) Negative      Protein, UA >=300 mg/dL (3+) Negative      Leuk Esterase, UA Negative Negative      Nitrite, UA Positive Negative      Urobilinogen, UA 2.0 E.U./dL 0.2 - 1.0 E.U./dL     CBC Auto Differential [[id number]]  (Abnormal) Collected: 08/31/24 1759    Order Status: Completed Specimen: Blood Updated: 08/31/24 1814     WBC 25.42 3.40 - 10.80 10*3/mm3      RBC 5.09 3.77 - 5.28 10*6/mm3      Hemoglobin 13.9 12.0 - 15.9 g/dL      Hematocrit 41.4 34.0 - 46.6 %      MCV 81.3 79.0 - 97.0 fL      MCH 27.3 26.6 - 33.0 pg      MCHC 33.6 31.5 - 35.7 g/dL      RDW 12.8 12.3 - 15.4 %      RDW-SD 37.3 37.0 - 54.0 fl      MPV 10.6 6.0 - 12.0 fL      Platelets 336 140 - 450 10*3/mm3      Neutrophil % 88.4 42.7 - 76.0 %      Lymphocyte % 6.3 19.6 - 45.3 %      Monocyte % 4.3 5.0 - 12.0 %      Eosinophil % 0.2 0.3 - 6.2 %      Basophil % 0.2 0.0 - 1.5 %      Immature Grans % 0.6 0.0 - 0.5 %      Neutrophils, Absolute 22.49 1.70 - 7.00 10*3/mm3      Lymphocytes, Absolute 1.59 0.70 - 3.10 10*3/mm3      Monocytes, Absolute 1.09 0.10 - 0.90 10*3/mm3      Eosinophils, Absolute 0.04 0.00 - 0.40 10*3/mm3      Basophils, Absolute 0.05 0.00 - 0.20 10*3/mm3      Immature Grans, Absolute 0.16 0.00 - 0.05 10*3/mm3      nRBC 0.0 0.0 - 0.2 /100 WBC              EKG:   No orders to display       Meds given in ED:   Medications   sodium chloride 0.9 % bolus 1,000 mL (1,000 mL Intravenous New Bag 8/31/24 1704)   sodium chloride 0.9 % bolus 2,802 mL (has no administration in time range)    cefTRIAXone (ROCEPHIN) 2,000 mg in sodium chloride 0.9 % 100 mL IVPB-VTB (2,000 mg Intravenous New Bag 8/31/24 1949)   vancomycin 1750 mg in sodium chloride 0.9% 500 mL IVPB (has no administration in time range)   fentaNYL citrate (PF) (SUBLIMAZE) injection 100 mcg (100 mcg Intravenous Given 8/31/24 1814)   ondansetron (ZOFRAN) injection 8 mg (8 mg Intravenous Given 8/31/24 1814)       Imaging results:  CT Abdomen Pelvis Without Contrast    Result Date: 8/31/2024  Impression: Interval placement of a left ureteral stent without evidence of complication. Interval resolution of left-sided hydronephrosis. No evidence of right-sided hydronephrosis. Nonobstructing left renal stone as indexed above. No evidence of right-sided nephrolithiasis. Electronically Signed: Zackary Peterson MD  8/31/2024 7:02 PM EDT  Workstation ID: [id number]     Ambulatory status:   - up with assist    Social issues:   Social History     Socioeconomic History    Marital status:    Tobacco Use    Smoking status: Never    Smokeless tobacco: Never   Vaping Use    Vaping status: Never Used   Substance and Sexual Activity    Alcohol use: No    Drug use: No    Sexual activity: Yes     Partners: Male     Comment:        NIH Stroke Scale: TALIA Arroyo RN  08/31/24 20:01 EDT

## 2024-09-02 ENCOUNTER — READMISSION MANAGEMENT (OUTPATIENT)
Dept: CALL CENTER | Facility: HOSPITAL | Age: 34
End: 2024-09-02
Payer: COMMERCIAL

## 2024-09-02 LAB — BACTERIA SPEC AEROBE CULT: NO GROWTH

## 2024-09-02 NOTE — OUTREACH NOTE
Prep Survey      Flowsheet Row Responses   [facility] patient discharged from? [place]   Is LACE score < 7 ? Yes   Eligibility Readm Mgmt   Discharge diagnosis Sepsis due to UTI   Does the patient have one of the following disease processes/diagnoses(primary or secondary)? Sepsis   Does the patient have Home health ordered? No   Is there a DME ordered? No   Prep survey completed? Yes            NAVI SAUNDERS - Registered Nurse

## 2024-09-05 LAB
BACTERIA SPEC AEROBE CULT: NORMAL
BACTERIA SPEC AEROBE CULT: NORMAL

## 2024-09-11 LAB
COLOR STONE: NORMAL
COM MFR STONE: 50 %
COMPN STONE: NORMAL
HYDROXYAPATITE: 50 %
LABORATORY COMMENT REPORT: NORMAL
LABORATORY COMMENT REPORT: NORMAL
Lab: NORMAL
Lab: NORMAL
PHOTO: NORMAL
SIZE STONE: NORMAL MM
SPEC SOURCE SUBJ: NORMAL
STONE ANALYSIS-IMP: NORMAL
WT STONE: 15 MG

## 2024-09-16 ENCOUNTER — OFFICE VISIT (OUTPATIENT)
Dept: CARDIOLOGY | Facility: CLINIC | Age: 34
End: 2024-09-16
Payer: COMMERCIAL

## 2024-09-16 VITALS
WEIGHT: 201.7 LBS | BODY MASS INDEX: 39.6 KG/M2 | HEIGHT: 60 IN | SYSTOLIC BLOOD PRESSURE: 110 MMHG | HEART RATE: 92 BPM | DIASTOLIC BLOOD PRESSURE: 72 MMHG | RESPIRATION RATE: 20 BRPM | OXYGEN SATURATION: 98 %

## 2024-09-16 DIAGNOSIS — R00.2 HEART PALPITATIONS: Primary | ICD-10-CM

## 2024-09-16 DIAGNOSIS — E66.01 MORBID OBESITY WITH BMI OF 40.0-44.9, ADULT: ICD-10-CM

## 2024-09-16 DIAGNOSIS — E78.00 HYPERCHOLESTEROLEMIA: ICD-10-CM

## 2024-09-16 PROCEDURE — 99203 OFFICE O/P NEW LOW 30 MIN: CPT | Performed by: INTERNAL MEDICINE

## 2024-09-16 RX ORDER — PROPRANOLOL HYDROCHLORIDE 10 MG/1
10 TABLET ORAL 2 TIMES DAILY
Qty: 60 TABLET | Refills: 3 | Status: SHIPPED | OUTPATIENT
Start: 2024-09-16

## 2024-09-24 ENCOUNTER — TRANSCRIBE ORDERS (OUTPATIENT)
Dept: ADMINISTRATIVE | Facility: HOSPITAL | Age: 34
End: 2024-09-24
Payer: COMMERCIAL

## 2024-09-24 DIAGNOSIS — N20.1 CALCULUS OF URETER: Primary | ICD-10-CM

## 2024-10-09 ENCOUNTER — HOSPITAL ENCOUNTER (OUTPATIENT)
Dept: ULTRASOUND IMAGING | Facility: HOSPITAL | Age: 34
Discharge: HOME | End: 2024-10-09
Admitting: UROLOGY
Payer: COMMERCIAL

## 2024-10-09 DIAGNOSIS — N20.1 CALCULUS OF URETER: ICD-10-CM

## 2024-10-09 PROCEDURE — 76775 US EXAM ABDO BACK WALL LIM: CPT

## 2024-11-06 ENCOUNTER — OFFICE VISIT (OUTPATIENT)
Dept: OBSTETRICS AND GYNECOLOGY | Facility: CLINIC | Age: 34
End: 2024-11-06
Payer: COMMERCIAL

## 2024-11-06 VITALS
BODY MASS INDEX: 40.44 KG/M2 | WEIGHT: 206 LBS | SYSTOLIC BLOOD PRESSURE: 118 MMHG | HEIGHT: 60 IN | DIASTOLIC BLOOD PRESSURE: 82 MMHG

## 2024-11-06 DIAGNOSIS — Z01.419 ROUTINE GYNECOLOGICAL EXAMINATION: Primary | ICD-10-CM

## 2024-11-06 LAB
B-HCG UR QL: NEGATIVE
BILIRUB BLD-MCNC: NEGATIVE MG/DL
CLARITY, POC: ABNORMAL
COLOR UR: YELLOW
EXPIRATION DATE: NORMAL
GLUCOSE UR STRIP-MCNC: NEGATIVE MG/DL
INTERNAL NEGATIVE CONTROL: NORMAL
INTERNAL POSITIVE CONTROL: NORMAL
KETONES UR QL: NEGATIVE
LEUKOCYTE EST, POC: ABNORMAL
Lab: NORMAL
NITRITE UR-MCNC: NEGATIVE MG/ML
PH UR: 6.5 [PH] (ref 5–8)
PROT UR STRIP-MCNC: NEGATIVE MG/DL
RBC # UR STRIP: NEGATIVE /UL
SP GR UR: 1.01 (ref 1–1.03)
UROBILINOGEN UR QL: NORMAL

## 2024-11-06 NOTE — PROGRESS NOTES
GYN Annual Exam     CC- Here for annual exam.     Denise Acosta is a 34 y.o. female who presents for annual well woman exam. Periods are absent. Ablation.     OB History          1    Para   1    Term           1    AB        Living   1         SAB        IAB        Ectopic        Molar        Multiple        Live Births   1                Current contraception: tubal ligation and vasectomy  History of abnormal Pap smear: no  Family history of uterine, colon or ovarian cancer: no  History of abnormal mammogram: no  Family history of breast cancer: yes - great aunt  Last Pap :  N/N    Past Medical History:   Diagnosis Date    Anxiety     Asthma     Blood in urine     Clostridium difficile diarrhea     Elevated cholesterol     GERD (gastroesophageal reflux disease)     Gestational hypertension     History of Clostridioides difficile colitis     IBS (irritable bowel syndrome)     Kidney stones     Migraines     PONV (postoperative nausea and vomiting)     Postural hypotension     Sepsis 2014    Tachycardia     PERIODS OF TACHYCARDIA WITH HOLTER MONITOR- MOST LIKELY RELATED TO KIDNEY STONES. APPOINTMENT WITH DR. SCOTT 24.    Urinary tract infection        Past Surgical History:   Procedure Laterality Date    CHOLECYSTECTOMY      D & C HYSTEROSCOPY ENDOMETRIAL ABLATION N/A 2023    Procedure: HYSTEROSCOPY NOVASURE ENDOMETRIAL ABLATION;  Surgeon: Troy Castro MD;  Location: [facility];  Service: Obstetrics/Gynecology;  Laterality: N/A;    ENDOMETRIAL ABLATION  2023    LAPAROSCOPIC CHOLECYSTECTOMY  2014    SALPINGECTOMY Bilateral 2023    Procedure: SALPINGECTOMY LAPAROSCOPIC;  Surgeon: Troy Castro MD;  Location: [facility];  Service: Obstetrics/Gynecology;  Laterality: Bilateral;    URETEROSCOPY LASER LITHOTRIPSY WITH STENT INSERTION Left 2024    Procedure: LEFT URETEROSCOPY BASKET STONE EXTRACTION AND STENT PLACEMENT;  Surgeon:  Gilberto Ramirez MD;  [facility];  Service: Urology;  Laterality: Left;    WISDOM TOOTH EXTRACTION           Current Outpatient Medications:     atorvastatin (LIPITOR) 80 MG tablet, Take 1 tablet by mouth Every Night., Disp: , Rfl:     dicyclomine (BENTYL) 10 MG capsule, Take 1 capsule by mouth As Needed., Disp: , Rfl:     famotidine (PEPCID) 20 MG tablet, Take 1 tablet by mouth 2 (Two) Times a Day., Disp: , Rfl:     hydrOXYzine (ATARAX) 25 MG tablet, Take 4 tablets by mouth Every Night., Disp: , Rfl:     Lactobacillus (PROBIOTIC ACIDOPHILUS PO), Take  by mouth Daily., Disp: , Rfl:     NON FORMULARY, Take 30 mg by mouth Daily. L-Methyl Folate, Disp: , Rfl:     omeprazole (priLOSEC) 20 MG capsule, Take 1 capsule by mouth Every Night., Disp: , Rfl:     propranolol (INDERAL) 10 MG tablet, Take 1 tablet by mouth 2 (Two) Times a Day., Disp: 60 tablet, Rfl: 3    Rimegepant Sulfate (Nurtec) 75 MG tablet dispersible tablet, Take 1 tablet by mouth Every Other Day., Disp: , Rfl:     vilazodone (VIIBRYD) 10 MG tablet tablet, Take 1 tablet by mouth Daily., Disp: , Rfl:     Allergies   Allergen Reactions    Sulfa Antibiotics Rash    Sulfasalazine Rash and Other (See Comments)    Sulfamethoxazole-Trimethoprim Other (See Comments)    Etodolac Hives     Annotation - 04Nov2019: hives  Annotation - 04Nov2019: hives  Annotation - 04Nov2019: hives      Hydrocodone-Acetaminophen Nausea And Vomiting    Methocarbamol Hives     Annotation - 04Nov2019: hives  Annotation - 04Nov2019: hives  Annotation - 04Nov2019: hives      Sumatriptan Other (See Comments)     Feels like having heart attack. Has chest pain and left arm heaviness       Social History     Tobacco Use    Smoking status: Never    Smokeless tobacco: Never   Vaping Use    Vaping status: Never Used   Substance Use Topics    Alcohol use: No    Drug use: No         Family History   Problem Relation Age of Onset    Osteoporosis Mother     Hypertension Father      "Multiple myeloma Maternal Grandmother     Osteoporosis Maternal Grandmother     Heart disease Maternal Grandfather     Heart attack Maternal Grandfather     Diabetes Maternal Grandfather         Type II    Colon cancer Paternal Grandmother     Diabetes Paternal Grandfather         type I    Clotting disorder Sister     Hypertension Sister     Heart attack Maternal Uncle     Breast cancer Maternal Aunt     Malig Hyperthermia Neg Hx        Review of Systems   Constitutional:  Negative for appetite change, fever and unexpected weight change.   HENT:  Negative for congestion and sore throat.    Respiratory:  Negative for cough and shortness of breath.    Cardiovascular:  Negative for chest pain and palpitations.   Gastrointestinal:  Negative for abdominal distention, abdominal pain, constipation, diarrhea, nausea and vomiting.   Endocrine: Negative.    Genitourinary:  Negative for dyspareunia, menstrual problem, pelvic pain and vaginal discharge.   Skin: Negative.    Neurological:  Negative for dizziness and syncope.   Hematological: Negative.    Psychiatric/Behavioral:  Negative for dysphoric mood and sleep disturbance. The patient is not nervous/anxious.      /82 (BP Location: Right arm, Patient Position: Sitting, Cuff Size: Adult)   Ht 151.1 cm (59.5\")   Wt 93.4 kg (206 lb)   BMI 40.91 kg/m²     Physical Exam  Vitals and nursing note reviewed. Exam conducted with a chaperone present.   Constitutional:       Appearance: She is well-developed.   HENT:      Head: Normocephalic and atraumatic.   Neck:      Thyroid: No thyromegaly.   Cardiovascular:      Rate and Rhythm: Normal rate and regular rhythm.   Pulmonary:      Effort: Pulmonary effort is normal.      Breath sounds: Normal breath sounds.   Chest:   Breasts:     Breasts are symmetrical.      Right: No mass or nipple discharge.      Left: No mass or nipple discharge.   Abdominal:      General: Bowel sounds are normal. There is no distension.      " Palpations: Abdomen is soft. There is no mass.      Tenderness: There is no abdominal tenderness. There is no guarding or rebound.   Genitourinary:     General: Normal vulva.      Exam position: Supine.      Labia:         Right: No lesion.         Left: No lesion.       Vagina: Normal.      Cervix: No cervical motion tenderness or discharge.      Uterus: Normal.       Adnexa:         Right: No mass.          Left: No mass.     Musculoskeletal:         General: Normal range of motion.      Cervical back: Normal range of motion and neck supple.   Skin:     General: Skin is warm and dry.   Neurological:      Mental Status: She is alert and oriented to person, place, and time.   Psychiatric:         Behavior: Behavior normal.         Thought Content: Thought content normal.         Judgment: Judgment normal.     Diagnoses and all orders for this visit:    Routine gynecological examination  -     POC Urinalysis Dipstick  -     POC Pregnancy, Urine        Assessment     1) GYN annual well woman exam.   2) pap UTD     Plan     1) Breast Health - Clinical breast exam & mammogram yearly, Self breast awareness monthly  2) Pap - Current  3) Smoking status - Denise KAUFFMAN Acosta  reports that she has never smoked. She has never used smokeless tobacco. I have educated her on the risk of diseases from using tobacco products such as cancer, COPD, and heart disease.   4) Follow up prn and one year    Encounter Diagnoses   Name Primary?    Routine gynecological examination Yes         Tryo Castro MD  11/6/2024  14:31 EST

## 2025-01-13 RX ORDER — PROPRANOLOL HYDROCHLORIDE 10 MG/1
10 TABLET ORAL 2 TIMES DAILY
Qty: 180 TABLET | Refills: 0 | Status: SHIPPED | OUTPATIENT
Start: 2025-01-13

## 2025-01-13 NOTE — TELEPHONE ENCOUNTER
LOV:9/16/2024    Benign palpitations likely from sinus tachycardia without associated symptoms-normal Holter and echo in July 2024  Hypercholesterolemia on atorvastatin  Migraine headache  ZOE     Encouraged her to start exercising regularly.  I will try her on low-dose propranolol  No further cardiac specific testing or treatment warranted.  She will follow-up with cardiology clinic as needed.      Ok to refill for now.  Per yh

## 2025-04-07 ENCOUNTER — TELEPHONE (OUTPATIENT)
Dept: OBSTETRICS AND GYNECOLOGY | Facility: CLINIC | Age: 35
End: 2025-04-07

## 2025-04-07 NOTE — TELEPHONE ENCOUNTER
Provider: DR. ODEN    Caller: Denise Acosta    Relationship to Patient: Self    Pharmacy:     Phone Number: [phone]    Reason for Call: ESTABLISHED PT (HAD A CALLBACK) ADV SHE IS CURRENTLY IN ER @ [facility] (SHE WORKS THERE) DUE TO 10 OUT OF 10 PAIN LEVEL @ LEFT OVARY. JUST WANTED TO MAKE DR. ODEN AWARE.    When was the patient last seen: 11/24

## 2025-04-11 RX ORDER — PROPRANOLOL HYDROCHLORIDE 10 MG/1
10 TABLET ORAL 2 TIMES DAILY
Qty: 180 TABLET | Refills: 0 | Status: SHIPPED | OUTPATIENT
Start: 2025-04-11

## 2025-04-21 ENCOUNTER — HOSPITAL ENCOUNTER (OUTPATIENT)
Dept: GENERAL RADIOLOGY | Facility: HOSPITAL | Age: 35
Discharge: HOME | End: 2025-04-21
Payer: COMMERCIAL

## 2025-04-21 ENCOUNTER — LAB (OUTPATIENT)
Dept: LAB | Facility: HOSPITAL | Age: 35
End: 2025-04-21
Payer: COMMERCIAL

## 2025-04-21 ENCOUNTER — OFFICE VISIT (OUTPATIENT)
Dept: GASTROENTEROLOGY | Facility: CLINIC | Age: 35
End: 2025-04-21
Payer: COMMERCIAL

## 2025-04-21 ENCOUNTER — TELEPHONE (OUTPATIENT)
Dept: GASTROENTEROLOGY | Facility: CLINIC | Age: 35
End: 2025-04-21

## 2025-04-21 VITALS
DIASTOLIC BLOOD PRESSURE: 70 MMHG | BODY MASS INDEX: 41.93 KG/M2 | WEIGHT: 208 LBS | HEIGHT: 59 IN | SYSTOLIC BLOOD PRESSURE: 110 MMHG

## 2025-04-21 DIAGNOSIS — K58.0 IRRITABLE BOWEL SYNDROME WITH DIARRHEA: ICD-10-CM

## 2025-04-21 DIAGNOSIS — R10.32 LEFT LOWER QUADRANT ABDOMINAL PAIN: ICD-10-CM

## 2025-04-21 DIAGNOSIS — K58.0 IRRITABLE BOWEL SYNDROME WITH DIARRHEA: Primary | ICD-10-CM

## 2025-04-21 DIAGNOSIS — K58.1 IRRITABLE BOWEL SYNDROME WITH CONSTIPATION: ICD-10-CM

## 2025-04-21 DIAGNOSIS — K58.1 IRRITABLE BOWEL SYNDROME WITH CONSTIPATION: Primary | ICD-10-CM

## 2025-04-21 PROCEDURE — 74018 RADEX ABDOMEN 1 VIEW: CPT

## 2025-04-21 PROCEDURE — 99204 OFFICE O/P NEW MOD 45 MIN: CPT | Performed by: STUDENT IN AN ORGANIZED HEALTH CARE EDUCATION/TRAINING PROGRAM

## 2025-04-21 PROCEDURE — 83993 ASSAY FOR CALPROTECTIN FECAL: CPT | Performed by: STUDENT IN AN ORGANIZED HEALTH CARE EDUCATION/TRAINING PROGRAM

## 2025-04-21 PROCEDURE — 87338 HPYLORI STOOL AG IA: CPT | Performed by: STUDENT IN AN ORGANIZED HEALTH CARE EDUCATION/TRAINING PROGRAM

## 2025-04-21 RX ORDER — RIZATRIPTAN BENZOATE 10 MG/1
10 TABLET ORAL ONCE AS NEEDED
COMMUNITY
Start: 2024-12-09

## 2025-04-21 RX ORDER — PLECANATIDE 3 MG/1
3 TABLET ORAL DAILY
Qty: 30 TABLET | Refills: 11 | Status: SHIPPED | OUTPATIENT
Start: 2025-04-21

## 2025-04-21 RX ORDER — EZETIMIBE 10 MG/1
10 TABLET ORAL DAILY
COMMUNITY
Start: 2025-04-01 | End: 2025-09-28

## 2025-04-21 RX ORDER — PLECANATIDE 3 MG/1
3 TABLET ORAL DAILY
Qty: 30 TABLET | Refills: 11 | Status: SHIPPED | OUTPATIENT
Start: 2025-04-21 | End: 2025-04-21 | Stop reason: SDUPTHER

## 2025-04-21 RX ORDER — ZOLPIDEM TARTRATE 5 MG/1
5 TABLET ORAL NIGHTLY PRN
COMMUNITY
Start: 2025-03-06

## 2025-04-21 RX ORDER — HYOSCYAMINE SULFATE 0.38 MG/1
0.38 TABLET, EXTENDED RELEASE ORAL EVERY 12 HOURS PRN
COMMUNITY
Start: 2025-04-08 | End: 2026-04-08

## 2025-04-21 NOTE — PROGRESS NOTES
Denise Acosta is a 34 y.o. female with a past medical history of IBS, Asthma, s/p CCY, CDI + noted below who presents for evaluation of   Chief Complaint   Patient presents with    Abdominal Pain    Diarrhea       Subjective     # LLQ Abdominal Pain   # IBS-D   - Endorses sharp LLQ to infraumbilical abdominal pain ongoing for ~ 1 month.   - Reports having at least a BM per day -- typically has 2 to 3 soft bowel movements per day.   - Had one episode of hematochezia last week but has had no recurrence.   - Denies NSAID use. Denies a family history of CRC or IBD.  - Her IBS symptoms were previously managed with Bentyl. Recently transitioned to Levsin and adherent. Also takes a daily probiotic.   - CT A/P in 8/2024 showed no acute abnormalities.   - Last EGD in 2022 -- op report not available via [facility] Everywhere.              Past Medical History:   Diagnosis Date    Anxiety     Asthma     Blood in urine     Cholelithiasis 5/2013    Had removed    Clostridium difficile diarrhea 2018    Elevated cholesterol     GERD (gastroesophageal reflux disease)     Gestational hypertension     History of Clostridioides difficile colitis     History of high cholesterol 2016    Hypertension     IBS (irritable bowel syndrome)     Kidney stones     Lactose intolerance 1/2023    Dairy free now    Migraines     PONV (postoperative nausea and vomiting)     Postural hypotension     Sepsis 04/2014    Tachycardia     PERIODS OF TACHYCARDIA WITH HOLTER MONITOR- MOST LIKELY RELATED TO KIDNEY STONES. APPOINTMENT WITH DR. SCOTT 9-16-24.    Urinary tract infection          Current Outpatient Medications:     atorvastatin (LIPITOR) 80 MG tablet, Take 1 tablet by mouth Every Night., Disp: , Rfl:     ezetimibe (ZETIA) 10 MG tablet, Take 1 tablet by mouth Daily., Disp: , Rfl:     famotidine (PEPCID) 20 MG tablet, Take 1 tablet by mouth 2 (Two) Times a Day., Disp: , Rfl:     hyoscyamine (LEVBID) 0.375 MG 12 hr tablet, Take 1 tablet by mouth  Every 12 (Twelve) Hours As Needed., Disp: , Rfl:     Lactobacillus (PROBIOTIC ACIDOPHILUS PO), Take  by mouth Daily., Disp: , Rfl:     omeprazole (priLOSEC) 20 MG capsule, Take 1 capsule by mouth Every Night., Disp: , Rfl:     propranolol (INDERAL) 10 MG tablet, TAKE 1 TABLET BY MOUTH 2 TIMES A DAY, Disp: 180 tablet, Rfl: 0    Rimegepant Sulfate (Nurtec) 75 MG tablet dispersible tablet, Take 1 tablet by mouth Every Other Day., Disp: , Rfl:     rizatriptan (MAXALT) 10 MG tablet, Take 1 tablet by mouth 1 (One) Time As Needed., Disp: , Rfl:     vilazodone (VIIBRYD) 10 MG tablet tablet, Take 1 tablet by mouth Daily., Disp: , Rfl:     zolpidem (AMBIEN) 5 MG tablet, Take 1 tablet by mouth At Night As Needed for Sleep., Disp: , Rfl:     hydrOXYzine (ATARAX) 25 MG tablet, Take 4 tablets by mouth Every Night. (Patient not taking: Reported on 4/21/2025), Disp: , Rfl:     NON FORMULARY, Take 30 mg by mouth Daily. L-Methyl Folate (Patient not taking: Reported on 4/21/2025), Disp: , Rfl:     Allergies   Allergen Reactions    Sulfa Antibiotics Rash    Sulfasalazine Rash and Other (See Comments)    Sulfamethoxazole-Trimethoprim Other (See Comments)    Etodolac Hives     Annotation - 04Nov2019: hives  Annotation - 04Nov2019: hives  Annotation - 04Nov2019: hives      Hydrocodone-Acetaminophen Nausea And Vomiting    Methocarbamol Hives     Annotation - 04Nov2019: hives  Annotation - 04Nov2019: hives  Annotation - 04Nov2019: hives      Sumatriptan Other (See Comments)     Feels like having heart attack. Has chest pain and left arm heaviness       Social History     Socioeconomic History    Marital status:    Tobacco Use    Smoking status: Never    Smokeless tobacco: Never   Vaping Use    Vaping status: Never Used   Substance and Sexual Activity    Alcohol use: No    Drug use: No    Sexual activity: Yes     Partners: Male     Birth control/protection: Other, Vasectomy, Bilateral salpingectomy      Comment:        Family  History   Problem Relation Age of Onset    Osteoporosis Mother     Colon polyps Mother     Hypertension Father     Colon polyps Father     Multiple myeloma Maternal Grandmother     Osteoporosis Maternal Grandmother     Heart disease Maternal Grandfather     Heart attack Maternal Grandfather     Diabetes Maternal Grandfather         Type II    Colon cancer Paternal Grandmother     Diabetes Paternal Grandfather         type I    Clotting disorder Sister     Hypertension Sister     Heart attack Maternal Uncle     Breast cancer Maternal Aunt     Malig Hyperthermia Neg Hx        Objective     Vitals:    04/21/25 1022   BP: 110/70         04/21/25  1022   Weight: 94.3 kg (208 lb)     Body mass index is 41.32 kg/m².    Physical Exam  Vitals reviewed.   Constitutional:       Appearance: Normal appearance.   HENT:      Head: Normocephalic and atraumatic.   Eyes:      Extraocular Movements: Extraocular movements intact.      Conjunctiva/sclera: Conjunctivae normal.   Cardiovascular:      Rate and Rhythm: Normal rate and regular rhythm.      Heart sounds: Normal heart sounds.   Pulmonary:      Effort: Pulmonary effort is normal.      Breath sounds: Normal breath sounds.   Abdominal:      General: Abdomen is flat. Bowel sounds are normal. There is no distension.      Palpations: Abdomen is soft.      Tenderness: There is no abdominal tenderness.   Neurological:      Mental Status: She is alert.   Psychiatric:         Mood and Affect: Mood normal.         Behavior: Behavior normal.         WBC   Date Value Ref Range Status   09/01/2024 15.09 (H) 3.40 - 10.80 10*3/mm3 Final   07/12/2024 9.0 3.8 - 10.8 K/uL Final   07/27/2018 5.2 4.0 - 11.0 x10(3)/mcL Final     Comment:     Reviewed by nader [id number]     RBC   Date Value Ref Range Status   09/01/2024 4.32 3.77 - 5.28 10*6/mm3 Final   07/12/2024 5.0 3.8 - 5.1 x10(6)/uL Final   07/27/2018 4.60 3.80 - 5.10 x10(6)/mcL Final     Hemoglobin   Date Value Ref Range Status   09/01/2024 11.7 (L)  12.0 - 15.9 g/dL Final   07/12/2024 14.0 11.5 - 15.5 Gram/dL Final   07/27/2018 13.0 12.0 - 15.6 g/dL Final     Comment:     Reviewed by nader [id number]     Hematocrit   Date Value Ref Range Status   09/01/2024 36.0 34.0 - 46.6 % Final   07/12/2024 40.8 35.0 - 45.0 % Final   07/27/2018 38.1 35.7 - 45.9 % Final     MCV   Date Value Ref Range Status   09/01/2024 83.3 79.0 - 97.0 fL Final   07/12/2024 82.3 80.0 - 100.0 fL Final     MCH   Date Value Ref Range Status   09/01/2024 27.1 26.6 - 33.0 pg Final   07/12/2024 28.3 27.0 - 33.0 pg Final     MCHC   Date Value Ref Range Status   09/01/2024 32.5 31.5 - 35.7 g/dL Final   07/12/2024 34.4 32.0 - 36.0 Gram/dL Final   07/27/2018 34.2 32.1 - 35.3 g/dL Final     RDW   Date Value Ref Range Status   09/01/2024 13.1 12.3 - 15.4 % Final   07/12/2024 13.9 11.0 - 15.0 % Final     RDW-SD   Date Value Ref Range Status   09/01/2024 39.5 37.0 - 54.0 fl Final     MPV   Date Value Ref Range Status   09/01/2024 10.4 6.0 - 12.0 fL Final   07/12/2024 8.8 7.5 - 11.5 fL Final     Platelets   Date Value Ref Range Status   09/01/2024 252 140 - 450 10*3/mm3 Final   07/27/2018 221 144 - 423 x10(3)/mcL Final     External Platelets   Date Value Ref Range Status   07/12/2024 293 140 - 400 x10(3)/uL Final     Neutrophil Rel %   Date Value Ref Range Status   07/25/2018 90.2 % Final     Neutrophil %   Date Value Ref Range Status   09/01/2024 76.5 (H) 42.7 - 76.0 % Final     Lymphocyte %   Date Value Ref Range Status   09/01/2024 18.6 (L) 19.6 - 45.3 % Final   07/12/2024 29.6 % Final     Monocyte %   Date Value Ref Range Status   09/01/2024 4.1 (L) 5.0 - 12.0 % Final   07/12/2024 5.5 % Final     Eosinophil %   Date Value Ref Range Status   09/01/2024 0.1 (L) 0.3 - 6.2 % Final   07/12/2024 0.6 % Final   07/25/2018 0.1 % Final     Basophil Rel %   Date Value Ref Range Status   07/25/2018 0.2 % Final     Basophil %   Date Value Ref Range Status   09/01/2024 0.1 0.0 - 1.5 % Final   07/12/2024 0.3 % Final      Immature Grans %   Date Value Ref Range Status   09/01/2024 0.6 (H) 0.0 - 0.5 % Final     Neutrophils Absolute   Date Value Ref Range Status   07/25/2018 17.3 (H) 1.8 - 7.7 x10(3)/mcL Final     Neutrophils, Absolute   Date Value Ref Range Status   09/01/2024 11.54 (H) 1.70 - 7.00 10*3/mm3 Final   07/12/2024 5.8 1.5 - 7.8 x10(3)/uL Final     Lymphocytes Absolute   Date Value Ref Range Status   07/25/2018 5.5 % Final     Lymphocytes, Absolute   Date Value Ref Range Status   09/01/2024 2.81 0.70 - 3.10 10*3/mm3 Final   07/25/2018 1.1 0.6 - 4.8 x10(3)/mcL Final     Monocytes Absolute   Date Value Ref Range Status   07/25/2018 0.8 0.0 - 1.3 x10(3)/mcL Final     Monocytes, Absolute   Date Value Ref Range Status   09/01/2024 0.62 0.10 - 0.90 10*3/mm3 Final   07/12/2024 0.5 0.2 - 1.0 x10(3)/uL Final     Eosinophils, Absolute   Date Value Ref Range Status   09/01/2024 0.01 0.00 - 0.40 10*3/mm3 Final   07/12/2024 0.1 (L) 0.2 - 0.5 x10(3)/uL Final     Basophils Absolute   Date Value Ref Range Status   07/25/2018 0.0 0.0 - 0.2 x10(3)/mcL Final     External Basophil Abs   Date Value Ref Range Status   07/12/2024 0.0 0.0 - 0.2 x10(3)/uL Final     Basophils, Absolute   Date Value Ref Range Status   09/01/2024 0.02 0.00 - 0.20 10*3/mm3 Final     Immature Grans, Absolute   Date Value Ref Range Status   09/01/2024 0.09 (H) 0.00 - 0.05 10*3/mm3 Final     nRBC   Date Value Ref Range Status   09/01/2024 0.0 0.0 - 0.2 /100 WBC Final       Glucose   Date Value Ref Range Status   09/01/2024 92 65 - 99 mg/dL Final     Sodium   Date Value Ref Range Status   09/01/2024 140 136 - 145 mmol/L Final   07/27/2018 139 136 - 145 mmol/L Final     Potassium   Date Value Ref Range Status   09/01/2024 4.0 3.5 - 5.2 mmol/L Final   07/28/2018 3.6 3.5 - 5.0 mmol/L Final     CO2   Date Value Ref Range Status   09/01/2024 23.4 22.0 - 29.0 mmol/L Final     Total CO2   Date Value Ref Range Status   07/27/2018 24 22 - 29 mmol/L Final     Chloride   Date  Value Ref Range Status   09/01/2024 107 98 - 107 mmol/L Final   07/27/2018 104 98 - 107 mmol/L Final     Anion Gap   Date Value Ref Range Status   09/01/2024 9.6 5.0 - 15.0 mmol/L Final   07/27/2018 11 7 - 16 mmol/L Final     Creatinine   Date Value Ref Range Status   09/01/2024 0.69 0.57 - 1.00 mg/dL Final   07/27/2018 0.47 (L) 0.51 - 1.30 mg/dL Final     BUN   Date Value Ref Range Status   09/01/2024 9 6 - 20 mg/dL Final   07/27/2018 3 (L) 6 - 20 mg/dL Final     BUN/Creatinine Ratio   Date Value Ref Range Status   09/01/2024 13.0 7.0 - 25.0 Final     Calcium   Date Value Ref Range Status   09/01/2024 7.6 (L) 8.6 - 10.5 mg/dL Final   07/27/2018 8.2 (L) 8.6 - 10.2 mg/dL Final     eGFR Non  Amer   Date Value Ref Range Status   06/01/2021 87 >60 mL/min/1.73 Final   07/27/2018 134 mL/min/1.73 m2 Final     Comment:     GFR Afr Am and GFR Non Afr Am calculated using CKD-EPI equation.    GFR Category      GFR(mL/min/1.73 m²)     Kidney Function   _______________________________________________________________  G1                >=90                    Normal or high   G2                60-89                   Mildly decreased   G3a               45-59                   Mildly to moderately decreased   G3b               30-44                   Moderately to severely decreased   G4                15-29                   Severely decreased   G5                <15                     Kidney Failure     Alkaline Phosphatase   Date Value Ref Range Status   08/31/2024 106 39 - 117 U/L Final   07/25/2018 113 (H) 35 - 104 IU/L Final     Total Protein   Date Value Ref Range Status   08/31/2024 7.8 6.0 - 8.5 g/dL Final   07/25/2018 8.2 6.4 - 8.3 gm/dL Final     ALT (SGPT)   Date Value Ref Range Status   08/31/2024 19 1 - 33 U/L Final   07/25/2018 35 <=41 IU/L Final     AST (SGOT)   Date Value Ref Range Status   08/31/2024 20 1 - 32 U/L Final   07/25/2018 30 <=40 IU/L Final     Total Bilirubin   Date Value Ref Range Status    08/31/2024 0.2 0.0 - 1.2 mg/dL Final   07/25/2018 0.5 0.1 - 1.3 mg/dL Final     Albumin   Date Value Ref Range Status   08/31/2024 4.4 3.5 - 5.2 g/dL Final   07/25/2018 4.7 3.5 - 5.2 gm/dL Final     Globulin   Date Value Ref Range Status   08/31/2024 3.4 gm/dL Final         Imaging Results (Last 7 Days)       ** No results found for the last 168 hours. **                   Assessment & Plan     Diagnoses and all orders for this visit:    1. Irritable bowel syndrome with diarrhea (Primary)  Assessment & Plan:  - KUB to assess for encopresis/colonic stool burden   - If no evidence of colonic stool burden, will start Elavil 25 mg QHS     Orders:  -     XR Abdomen KUB; Future    2. Left lower quadrant abdominal pain  Assessment & Plan:  - KUB as stated above   - Continue Levsin   - Obtain fecal calprotectin and H Pylori stool Ag   - If KUB is negative, will start Elavil 25 mg QHS     Orders:  -     H. Pylori Antigen, Stool - Stool, Per Rectum  -     Calprotectin, Fecal - Stool, Per Rectum  -     XR Abdomen KUB; Future      RTC in 2 months     I have discussed the above plan with the patient.  They verbalize understanding and are in agreement with the plan.  They have been advised to contact the office for any questions, concerns, or changes related to their health.

## 2025-04-21 NOTE — ASSESSMENT & PLAN NOTE
- KUB as stated above   - Continue Levsin   - Obtain fecal calprotectin and H Pylori stool Ag   - If KUB is negative, will start Elavil 25 mg QHS

## 2025-04-21 NOTE — ASSESSMENT & PLAN NOTE
- KUB to assess for encopresis/colonic stool burden   - If no evidence of colonic stool burden, will start Elavil 25 mg QHS

## 2025-04-23 ENCOUNTER — TELEPHONE (OUTPATIENT)
Dept: GASTROENTEROLOGY | Facility: CLINIC | Age: 35
End: 2025-04-23
Payer: COMMERCIAL

## 2025-04-23 DIAGNOSIS — K58.1 IRRITABLE BOWEL SYNDROME WITH CONSTIPATION: Primary | ICD-10-CM

## 2025-04-23 DIAGNOSIS — R10.32 LEFT LOWER QUADRANT ABDOMINAL PAIN: ICD-10-CM

## 2025-04-23 LAB — H PYLORI AG STL QL IA: NEGATIVE

## 2025-04-23 NOTE — TELEPHONE ENCOUNTER
Her anal leakage and nausea are likely due to being backed up with stool/constipation so I highly recommend starting the Trulance which will improve her symptoms within the first 2 weeks of taking it

## 2025-04-23 NOTE — TELEPHONE ENCOUNTER
PT SAW DR CONTRERAS THIS WEEK    SHE FORGOT TO MENTION AT THE APT THAT SHE HAS BEEN HAVING DAILY ANAL LEAKAGE. HAS FOR A GOOD WHILE    ALSO, ALL WEEK SHE HAS BEEN GETTING REALLY NAUSEOUS OUT OF NOWHERE  EACH DAY    SHE HAS NOT STARTED TAKING HER TRULANCE    REQUESTS A CALL [phone]

## 2025-04-23 NOTE — TELEPHONE ENCOUNTER
Patient has been advised to start Trulance as discussed at office visit to help with symptoms.   Patient gave verbal understanding.

## 2025-04-24 LAB — CALPROTECTIN STL-MCNT: 144 UG/G (ref 0–120)

## 2025-04-24 NOTE — TELEPHONE ENCOUNTER
Spoke with patient, she states that she started Trulance over an hour ago and she has already had more than 8 watery bowel movements. She is taking probiotic.   Per APRN, keep taking Trulance and continue to monitor and stay hydrated. Advised that this is the clean out phase and it should improve. If no improvement over the weekend, advised patient to call the office.   Patient gave verbal understanding.

## 2025-04-28 ENCOUNTER — HOSPITAL ENCOUNTER (OUTPATIENT)
Dept: GENERAL RADIOLOGY | Facility: HOSPITAL | Age: 35
Discharge: HOME | End: 2025-04-28
Payer: COMMERCIAL

## 2025-04-28 DIAGNOSIS — R19.5 ELEVATED FECAL CALPROTECTIN: Primary | ICD-10-CM

## 2025-04-28 DIAGNOSIS — K58.1 IRRITABLE BOWEL SYNDROME WITH CONSTIPATION: ICD-10-CM

## 2025-04-28 PROCEDURE — 74018 RADEX ABDOMEN 1 VIEW: CPT

## 2025-04-28 NOTE — TELEPHONE ENCOUNTER
Upon attempt to send in amitriptyline it appears that it has an interaction with her Viibryd.  We will hold off on prescribing and wait to see what the x-ray shows before considering sending in prescription for amitriptyline.    Jordyn Wright, BETZY

## 2025-04-28 NOTE — TELEPHONE ENCOUNTER
Patient stated that she is having multiple watery BM a day, along with stabbing abdominal pain. She states that her symptoms do not last as long as the were when she first started Trulance on Thursday. I advised that she could she be going through the clean out phase but I would relay her message to APRN. Patient gave verbal understanding.

## 2025-04-28 NOTE — TELEPHONE ENCOUNTER
Patient confirmed she has not been taking fiber or amitriptyline. Advised to start fiber and obtain xray.   Patient gave verbal understanding.

## 2025-04-28 NOTE — TELEPHONE ENCOUNTER
I would also like for her to restart amitriptyline as this was recommended by Dr. Raymundo at the time of her last visit and has been show to be helpfulimproving abdominal pain.    BETZY Mcleod

## 2025-04-28 NOTE — TELEPHONE ENCOUNTER
Lets go ahead and obtain an abdominal x-ray to assess current amount of stool within the colon since starting the Trulance.  Can you please also confirm that patient is taking a fiber supplementation such as Metamucil as this can be helpful in improving tolerance to treatment plan.    Can you please confirm that patient is also taking amitriptyline?    We can consider trialing Ibsrela beginning once daily x 7 days and increasing to twice daily with meals.  She would like to try an alternative from the Trulance    Thank you,    BETZY Mcleod

## 2025-04-30 ENCOUNTER — LAB (OUTPATIENT)
Dept: LAB | Facility: HOSPITAL | Age: 35
End: 2025-04-30
Payer: COMMERCIAL

## 2025-04-30 DIAGNOSIS — R19.5 ELEVATED FECAL CALPROTECTIN: ICD-10-CM

## 2025-04-30 PROCEDURE — 36415 COLL VENOUS BLD VENIPUNCTURE: CPT

## 2025-05-12 LAB — REF LAB TEST METHOD: NORMAL

## 2025-06-03 ENCOUNTER — OFFICE VISIT (OUTPATIENT)
Dept: ORTHOPEDIC SURGERY | Facility: CLINIC | Age: 35
End: 2025-06-03
Payer: COMMERCIAL

## 2025-06-03 ENCOUNTER — TELEPHONE (OUTPATIENT)
Dept: GASTROENTEROLOGY | Facility: CLINIC | Age: 35
End: 2025-06-03
Payer: COMMERCIAL

## 2025-06-03 VITALS
SYSTOLIC BLOOD PRESSURE: 108 MMHG | HEIGHT: 59 IN | HEART RATE: 84 BPM | DIASTOLIC BLOOD PRESSURE: 75 MMHG | BODY MASS INDEX: 41.12 KG/M2 | WEIGHT: 204 LBS

## 2025-06-03 DIAGNOSIS — M17.12 PATELLOFEMORAL ARTHRITIS OF LEFT KNEE: ICD-10-CM

## 2025-06-03 DIAGNOSIS — M25.562 LEFT KNEE PAIN, UNSPECIFIED CHRONICITY: Primary | ICD-10-CM

## 2025-06-03 RX ORDER — TRIAMCINOLONE ACETONIDE 40 MG/ML
80 INJECTION, SUSPENSION INTRA-ARTICULAR; INTRAMUSCULAR
Status: COMPLETED | OUTPATIENT
Start: 2025-06-03 | End: 2025-06-03

## 2025-06-03 RX ORDER — ZOLPIDEM TARTRATE 10 MG/1
10 TABLET ORAL NIGHTLY PRN
COMMUNITY
Start: 2025-05-24

## 2025-06-03 RX ORDER — LIDOCAINE HYDROCHLORIDE 10 MG/ML
4 INJECTION, SOLUTION EPIDURAL; INFILTRATION; INTRACAUDAL; PERINEURAL
Status: COMPLETED | OUTPATIENT
Start: 2025-06-03 | End: 2025-06-03

## 2025-06-03 RX ORDER — VILAZODONE HYDROCHLORIDE 10 MG/1
10 TABLET ORAL
COMMUNITY
Start: 2025-05-12

## 2025-06-03 RX ORDER — MELOXICAM 7.5 MG/1
7.5 TABLET ORAL DAILY
Qty: 30 TABLET | Refills: 5 | Status: SHIPPED | OUTPATIENT
Start: 2025-06-03

## 2025-06-03 RX ADMIN — TRIAMCINOLONE ACETONIDE 80 MG: 40 INJECTION, SUSPENSION INTRA-ARTICULAR; INTRAMUSCULAR at 14:23

## 2025-06-03 RX ADMIN — LIDOCAINE HYDROCHLORIDE 4 ML: 10 INJECTION, SOLUTION EPIDURAL; INFILTRATION; INTRACAUDAL; PERINEURAL at 14:23

## 2025-06-03 NOTE — TELEPHONE ENCOUNTER
Patient was previously prescribed Meloxicam by her othro doctor. She would like to make sure from a GI standpoint that it is okay for her to take. Will send to provider to review.

## 2025-06-03 NOTE — PROGRESS NOTES
Subjective: Left knee pain     Patient ID: Denise Acosta is a 34 y.o. female.    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  Notes history of trauma but does recall she did do some cleaning when she was on her knees for extended period of time since that time she has had moderate pain that she describes 7 out of 10.  Describes a throbbing shooting achy pain describes occasional swelling has difficulty climbing stairs and walking long distances taken occasional ibuprofen but cannot take it on a regular basis due to GI side effects.       Social History     Occupational History    Not on file   Tobacco Use    Smoking status: Never    Smokeless tobacco: Never   Vaping Use    Vaping status: Never Used   Substance and Sexual Activity    Alcohol use: No    Drug use: No    Sexual activity: Yes     Partners: Male     Birth control/protection: Other, Vasectomy, Bilateral salpingectomy      Comment:       Review of Systems      Past Medical History:   Diagnosis Date    Anxiety     Asthma     Blood in urine     Cholelithiasis 5/2013    Had removed    Clostridium difficile diarrhea 2018    Elevated cholesterol     GERD (gastroesophageal reflux disease)     Gestational hypertension     History of Clostridioides difficile colitis     History of high cholesterol 2016    Hypertension     IBS (irritable bowel syndrome)     Kidney stones     Lactose intolerance 1/2023    Dairy free now    Migraines     PONV (postoperative nausea and vomiting)     Postural hypotension     Sepsis 04/2014    Stress fracture 2018    R foot    Tachycardia     PERIODS OF TACHYCARDIA WITH HOLTER MONITOR- MOST LIKELY RELATED TO KIDNEY STONES. APPOINTMENT WITH DR. SCOTT 9-16-24.    Urinary tract infection      Past Surgical History:   Procedure Laterality Date    CHOLECYSTECTOMY      D & C HYSTEROSCOPY  ENDOMETRIAL ABLATION N/A 07/17/2023    Procedure: HYSTEROSCOPY NOVASURE ENDOMETRIAL ABLATION;  Surgeon: Troy Castro MD;  Location: [facility] OR;  Service: Obstetrics/Gynecology;  Laterality: N/A;    ENDOMETRIAL ABLATION  7/2023    LAPAROSCOPIC CHOLECYSTECTOMY  6/2014    SALPINGECTOMY Bilateral 07/17/2023    Procedure: SALPINGECTOMY LAPAROSCOPIC;  Surgeon: Troy Castro MD;  Location:  [facility] OR;  Service: Obstetrics/Gynecology;  Laterality: Bilateral;    TUBAL ABDOMINAL LIGATION  7/2023    Salpingectomy    URETEROSCOPY LASER LITHOTRIPSY WITH STENT INSERTION Left 08/30/2024    Procedure: LEFT URETEROSCOPY BASKET STONE EXTRACTION AND STENT PLACEMENT;  Surgeon: Gilberto Ramirez MD;  Location: [facility] MAIN OR;  Service: Urology;  Laterality: Left;    WISDOM TOOTH EXTRACTION       Family History   Problem Relation Age of Onset    Osteoporosis Mother     Colon polyps Mother     Anesthesia problems Mother         Nausea/vomiting    Hypertension Father     Colon polyps Father     Multiple myeloma Maternal Grandmother     Osteoporosis Maternal Grandmother     Heart disease Maternal Grandfather     Heart attack Maternal Grandfather     Diabetes Maternal Grandfather         Type II    Colon cancer Paternal Grandmother     Diabetes Paternal Grandfather         type I    Clotting disorder Sister     Hypertension Sister     Heart attack Maternal Uncle     Breast cancer Maternal Aunt     Malig Hyperthermia Neg Hx          Objective:  Vitals:    06/03/25 1404   BP: 108/75   Pulse: 84         06/03/25  1404   Weight: 92.5 kg (204 lb)     Body mass index is 40.53 kg/m².  Class 3 Severe Obesity (BMI >=40). Obesity-related health conditions include the following:  . Obesity is  . BMI is  . We discussed portion control, increasing exercise, and  .        Ortho Exam   AP lateral sunrise view of the left knee does show some patellofemoral changes otherwise unremarkable.  She is alert and oriented x 3.  Head is  normocephalic and sclerae clear.  The knee shows no swelling effusion erythema and is cool to touch.  She has 0 to 130 degrees of motion with mild patellofemoral crepitance and extension.  Quad hamstring function 5/5.  No instability in flexion extension nor varus or valgus instability 10 to 30 degrees.  No joint line tenderness.  Calf nontender.  Good is the pulses no motor sensory deficit.  She tolerates anti-inflammatories but again does not take it on a regular basis due to potential GI side effects.    Assessment:    - Large Joint Arthrocentesis: L knee on 6/3/2025 2:23 PM  Indications: pain  Details: 22 G needle, superolateral approach  Medications: 4 mL lidocaine PF 1% 1 %; 80 mg triamcinolone acetonide 40 MG/ML  Outcome: tolerated well, no immediate complications  Procedure, treatment alternatives, risks and benefits explained, specific risks discussed. Consent was given by the patient. Immediately prior to procedure a time out was called to verify the correct patient, procedure, equipment, support staff and site/side marked as required. Patient was prepped and draped in the usual sterile fashion.                1. Left knee pain, unspecified chronicity    2. Patellofemoral arthritis of left knee           Plan: Reviewed with the patient her history x-ray and physical exam.  She is developing some patellofemoral changes which I believe is causing her pain and discomfort.  After reviewing treatment options regardlessly with a cortisone injection given through the superolateral portal with a sterile prep and 4 cc of lidocaine and 2 cc of Kenalog.  Postinjection instructions given.  Will prescribe meloxicam but she is kaushik contact her gastroenterologist to see if it is okay that she takes it for the next 4 weeks.  If she cannot take the meloxicam she is to try Voltaren gel 3-4 times a day.  Return to see me in 4 weeks if still symptomatic otherwise as needed.  Answered all questions            Work  Status:    LIZABETH query complete.    Orders:  Orders Placed This Encounter   Procedures    - Large Joint Arthrocentesis: L knee    XR Knee 3+ View With [place] Left       Medications:  New Medications Ordered This Visit   Medications    meloxicam (MOBIC) 7.5 MG tablet     Sig: Take 1 tablet by mouth Daily.     Dispense:  30 tablet     Refill:  5       Followup:  Return in about 4 weeks (around 7/1/2025).          Dictated utilizing Dragon dictation

## 2025-06-04 ENCOUNTER — PATIENT ROUNDING (BHMG ONLY) (OUTPATIENT)
Dept: ORTHOPEDIC SURGERY | Facility: CLINIC | Age: 35
End: 2025-06-04
Payer: COMMERCIAL

## 2025-06-04 NOTE — TELEPHONE ENCOUNTER
It is ultimately ok to take Meloxicam sparingly. It can cause ulcers in your GI tract if you take it regularly. I would take Tylenol first then Meloxicam for breakthrough pain if needed

## 2025-06-04 NOTE — PROGRESS NOTES
A My-Chart message has been sent to the patient for PATIENT ROUNDING with [facility].

## 2025-06-10 ENCOUNTER — TELEPHONE (OUTPATIENT)
Dept: ORTHOPEDIC SURGERY | Facility: CLINIC | Age: 35
End: 2025-06-10
Payer: COMMERCIAL

## 2025-06-10 DIAGNOSIS — M17.12 PATELLOFEMORAL ARTHRITIS OF LEFT KNEE: Primary | ICD-10-CM

## 2025-06-10 NOTE — TELEPHONE ENCOUNTER
Patient had an injection in left knee 06-04-25. She said her knee is now swollen and pitted edema in the shin area. She has already had oral steroids. She wants to know if you can go ahead and order a MRI.

## 2025-06-11 ENCOUNTER — OFFICE VISIT (OUTPATIENT)
Dept: ORTHOPEDIC SURGERY | Facility: CLINIC | Age: 35
End: 2025-06-11
Payer: COMMERCIAL

## 2025-06-11 VITALS — HEIGHT: 59 IN | WEIGHT: 204 LBS | BODY MASS INDEX: 41.12 KG/M2

## 2025-06-11 DIAGNOSIS — M17.12 PATELLOFEMORAL ARTHRITIS OF LEFT KNEE: Primary | ICD-10-CM

## 2025-06-11 PROCEDURE — 99213 OFFICE O/P EST LOW 20 MIN: CPT | Performed by: ORTHOPAEDIC SURGERY

## 2025-06-11 NOTE — PROGRESS NOTES
Subjective: Left knee pain     Patient ID: Denise Acosta is a 34 y.o. female.    Chief Complaint:    History of Present Illness 34-year-old female with just the pain and discomfort in her knee.  The cortisone injection and the meloxicam offered no relief at all as she still having patella and medial compartment pain and discomfort with activity.  No real pain at rest but with any activity she will develop pain and even some swelling that she noted yesterday.       Social History     Occupational History    Not on file   Tobacco Use    Smoking status: Never    Smokeless tobacco: Never   Vaping Use    Vaping status: Never Used   Substance and Sexual Activity    Alcohol use: No    Drug use: No    Sexual activity: Yes     Partners: Male     Birth control/protection: Other, Vasectomy, Bilateral salpingectomy      Comment:       Review of Systems      Past Medical History:   Diagnosis Date    Anxiety     Asthma     Blood in urine     Cholelithiasis 5/2013    Had removed    Clostridium difficile diarrhea 2018    Elevated cholesterol     GERD (gastroesophageal reflux disease)     Gestational hypertension     History of Clostridioides difficile colitis     History of high cholesterol 2016    Hypertension     IBS (irritable bowel syndrome)     Kidney stones     Lactose intolerance 1/2023    Dairy free now    Migraines     PONV (postoperative nausea and vomiting)     Postural hypotension     Sepsis 04/2014    Stress fracture 2018    R foot    Tachycardia     PERIODS OF TACHYCARDIA WITH HOLTER MONITOR- MOST LIKELY RELATED TO KIDNEY STONES. APPOINTMENT WITH DR. SCOTT 9-16-24.    Urinary tract infection      Past Surgical History:   Procedure Laterality Date    CHOLECYSTECTOMY      D & C HYSTEROSCOPY ENDOMETRIAL ABLATION N/A 07/17/2023    Procedure: HYSTEROSCOPY NOVASURE ENDOMETRIAL ABLATION;  Surgeon: Troy Castro MD;  Location: [facility];  Service: Obstetrics/Gynecology;  Laterality: N/A;     ENDOMETRIAL ABLATION  7/2023    LAPAROSCOPIC CHOLECYSTECTOMY  6/2014    SALPINGECTOMY Bilateral 07/17/2023    Procedure: SALPINGECTOMY LAPAROSCOPIC;  Surgeon: Troy Castro MD;  Location: [facility] OR;  Service: Obstetrics/Gynecology;  Laterality: Bilateral;    TUBAL ABDOMINAL LIGATION  7/2023    Salpingectomy    URETEROSCOPY LASER LITHOTRIPSY WITH STENT INSERTION Left 08/30/2024    Procedure: LEFT URETEROSCOPY BASKET STONE EXTRACTION AND STENT PLACEMENT;  Surgeon: Gilberto Ramirez MD;  Location: [facility] MAIN OR;  Service: Urology;  Laterality: Left;    WISDOM TOOTH EXTRACTION       Family History   Problem Relation Age of Onset    Osteoporosis Mother     Colon polyps Mother     Anesthesia problems Mother         Nausea/vomiting    Hypertension Father     Colon polyps Father     Multiple myeloma Maternal Grandmother     Osteoporosis Maternal Grandmother     Heart disease Maternal Grandfather     Heart attack Maternal Grandfather     Diabetes Maternal Grandfather         Type II    Colon cancer Paternal Grandmother     Diabetes Paternal Grandfather         type I    Clotting disorder Sister     Hypertension Sister     Heart attack Maternal Uncle     Breast cancer Maternal Aunt     Malig Hyperthermia Neg Hx          Objective:  There were no vitals filed for this visit.      06/11/25  0828   Weight: 92.5 kg (204 lb)     Body mass index is 40.53 kg/m².           Ortho Exam   she is alert and oriented x 3.  The knee shows no swelling effusion erythema today and is cool to touch.  She has 0 to 125 degrees of motion patellofemoral crepitus and pain.  There is medial joint line tenderness today but negative Serenity's.  Quad hamstring function 5/5.  No instability in flexion extension no any varus or valgus instability 10 to 30 degrees.  Calf nontender good distal pulses tolerating the meloxicam and is not getting any GI upset at this time.    Assessment:        1. Patellofemoral arthritis of left knee            Plan: Reviewed with the patient her symptoms history and exam.  She is having persistent pain and failed respond to the cortisone and the meloxicam so my concern is that she has got an os chondral lesion.  I ordered an MRI and I will see her back after that has been completed.  I did tell her to stop the meloxicam as I do not want her to develop any kind of ulcers even though she is tolerating fairly well.  She can use Voltaren gel in the interim.  Return to see me with results of the MRI.  Answered all questions            Work Status:    LIZABETH query complete.    Orders:  No orders of the defined types were placed in this encounter.      Medications:  No orders of the defined types were placed in this encounter.      Followup:  Return in about 4 weeks (around 7/9/2025).          Dictated utilizing Dragon dictation

## 2025-06-30 PROBLEM — K58.1 IRRITABLE BOWEL SYNDROME WITH CONSTIPATION: Status: ACTIVE | Noted: 2025-04-21

## 2025-07-01 ENCOUNTER — OFFICE VISIT (OUTPATIENT)
Dept: GASTROENTEROLOGY | Facility: CLINIC | Age: 35
End: 2025-07-01
Payer: COMMERCIAL

## 2025-07-01 VITALS
SYSTOLIC BLOOD PRESSURE: 102 MMHG | DIASTOLIC BLOOD PRESSURE: 70 MMHG | WEIGHT: 200.2 LBS | HEIGHT: 59 IN | BODY MASS INDEX: 40.36 KG/M2

## 2025-07-01 DIAGNOSIS — R10.32 LEFT LOWER QUADRANT ABDOMINAL PAIN: Primary | ICD-10-CM

## 2025-07-01 DIAGNOSIS — K58.1 IRRITABLE BOWEL SYNDROME WITH CONSTIPATION: ICD-10-CM

## 2025-07-01 PROCEDURE — 99214 OFFICE O/P EST MOD 30 MIN: CPT | Performed by: STUDENT IN AN ORGANIZED HEALTH CARE EDUCATION/TRAINING PROGRAM

## 2025-07-01 RX ORDER — SOD SULF/POT CHLORIDE/MAG SULF 1.479 G
1 TABLET ORAL ONCE
Qty: 1 TABLET | Refills: 0 | COMMUNITY
Start: 2025-07-01 | End: 2025-07-01

## 2025-07-01 RX ORDER — LORAZEPAM 0.5 MG/1
0.5 TABLET ORAL EVERY 8 HOURS PRN
COMMUNITY
Start: 2025-06-11 | End: 2025-09-09

## 2025-07-01 RX ORDER — HYOSCYAMINE SULFATE 0.12 MG/1
0.12 TABLET ORAL
Qty: 120 TABLET | Refills: 11 | Status: SHIPPED | OUTPATIENT
Start: 2025-07-01

## 2025-07-01 NOTE — ASSESSMENT & PLAN NOTE
- Improved but somewhat persistent (awakens her from her sleep)  - Suspect it is due to constipation as it has improved since starting Trulance   - Schedule colonoscopy to further evaluate. Sutab prep ordered   - Renew PRN Levsin -- instructed it is ok to take sparingly

## 2025-07-01 NOTE — PROGRESS NOTES
Denise Acosta is a 34 y.o. female with PMH of IBS-C, Asthma, s/p CCY, CDI + noted below who presents with   Chief Complaint   Patient presents with    Irritable bowel syndrome with diarrhea    Abdominal Pain       Subjective     # LLQ Abdominal Pain   # IBS-C  - LLQ pain improved but still flares up when she's on her period. Awakens her from her sleep. Also exacerbated when she eats nuts or strawberries.   - Started on Trulance 3 mg QD following her last appointment and has been adherent.   - Reports having a BM daily.  - KUB on 4/21 showed moderate colonic stool burden. KUB on 4/28 showed mild to moderate colonic stool burden (improved after being on Trulance for ~ 1 week).  - CT A/P in 8/2024 showed no acute abnormalities.   - Last EGD in 2022 -- op report not available via [facility].  - Fecal calprotectin was elevated in 4/2025 followed by negative Prom[facility] panel. H Pylori stool Ag was negative in 4/2025.     # GERD   - Adherent to Omeprazole 20 mg QD.Denies heartburn.            Past Medical History:   Diagnosis Date    Anxiety     Asthma     Blood in urine     Cholelithiasis 5/2013    Had removed    Clostridium difficile diarrhea 2018    Elevated cholesterol     GERD (gastroesophageal reflux disease)     Gestational hypertension     History of Clostridioides difficile colitis     History of high cholesterol 2016    Hypertension     IBS (irritable bowel syndrome)     Kidney stones     Lactose intolerance 1/2023    Dairy free now    Migraines     PONV (postoperative nausea and vomiting)     Postural hypotension     Sepsis 04/2014    Stress fracture 2018    R foot    Tachycardia     PERIODS OF TACHYCARDIA WITH HOLTER MONITOR- MOST LIKELY RELATED TO KIDNEY STONES. APPOINTMENT WITH DR. SCOTT 9-16-24.    Urinary tract infection        Social History     Socioeconomic History    Marital status:    Tobacco Use    Smoking status: Never    Smokeless tobacco: Never   Vaping Use    Vaping status:  Never Used   Substance and Sexual Activity    Alcohol use: No    Drug use: No    Sexual activity: Yes     Partners: Male     Birth control/protection: Other, Vasectomy, Bilateral salpingectomy      Comment:          Current Outpatient Medications:     atorvastatin (LIPITOR) 80 MG tablet, Take 1 tablet by mouth Every Night., Disp: , Rfl:     ezetimibe (ZETIA) 10 MG tablet, Take 1 tablet by mouth Daily., Disp: , Rfl:     famotidine (PEPCID) 20 MG tablet, Take 1 tablet by mouth 2 (Two) Times a Day., Disp: , Rfl:     Lactobacillus (PROBIOTIC ACIDOPHILUS PO), Take  by mouth Daily., Disp: , Rfl:     LORazepam (ATIVAN) 0.5 MG tablet, Take 1 tablet by mouth Every 8 (Eight) Hours As Needed., Disp: , Rfl:     meloxicam (MOBIC) 7.5 MG tablet, Take 1 tablet by mouth Daily., Disp: 30 tablet, Rfl: 5    NON FORMULARY, Take 30 mg by mouth Daily. L-Methyl Folate, Disp: , Rfl:     omeprazole (priLOSEC) 20 MG capsule, Take 1 capsule by mouth Every Night., Disp: , Rfl:     Plecanatide (Trulance) 3 MG tablet, Take 1 tablet by mouth Daily., Disp: 30 tablet, Rfl: 11    propranolol (INDERAL) 10 MG tablet, TAKE 1 TABLET BY MOUTH 2 TIMES A DAY, Disp: 180 tablet, Rfl: 0    Rimegepant Sulfate (Nurtec) 75 MG tablet dispersible tablet, Take 1 tablet by mouth Every Other Day., Disp: , Rfl:     rizatriptan (MAXALT) 10 MG tablet, Take 1 tablet by mouth 1 (One) Time As Needed., Disp: , Rfl:     vilazodone (VIIBRYD) 10 MG tablet tablet, Take 1 tablet by mouth Daily With Breakfast., Disp: , Rfl:     zolpidem (AMBIEN) 10 MG tablet, Take 1 tablet by mouth At Night As Needed for Sleep., Disp: , Rfl:     hyoscyamine (ANASPAZ,LEVSIN) 0.125 MG tablet, Take 1 tablet by mouth 4 (Four) Times a Day With Meals & at Bedtime., Disp: 120 tablet, Rfl: 11    Sodium Sulfate-Mag Sulfate-KCl (Sutab) 5865-090-065 MG tablet, Take 1 tablet by mouth 1 (One) Time for 1 dose., Disp: 1 tablet, Rfl: 0    Objective   Vitals:    07/01/25 1246   BP: 102/70          07/01/25  1246   Weight: 90.8 kg (200 lb 3.2 oz)     Body mass index is 39.77 kg/m².      Physical Exam  Vitals reviewed.   Constitutional:       Appearance: Normal appearance.   HENT:      Head: Normocephalic and atraumatic.   Eyes:      Extraocular Movements: Extraocular movements intact.      Conjunctiva/sclera: Conjunctivae normal.   Cardiovascular:      Rate and Rhythm: Normal rate and regular rhythm.      Heart sounds: Normal heart sounds.   Pulmonary:      Effort: Pulmonary effort is normal.      Breath sounds: Normal breath sounds.   Abdominal:      General: Abdomen is flat. Bowel sounds are normal. There is no distension.      Palpations: Abdomen is soft.      Tenderness: There is abdominal tenderness (mild LLQ).   Neurological:      Mental Status: She is alert.   Psychiatric:         Mood and Affect: Mood normal.         Behavior: Behavior normal.         WBC   Date Value Ref Range Status   09/01/2024 15.09 (H) 3.40 - 10.80 10*3/mm3 Final   07/12/2024 9.0 3.8 - 10.8 K/uL Final   07/27/2018 5.2 4.0 - 11.0 x10(3)/mcL Final     Comment:     Reviewed by nader Dinh     RBC   Date Value Ref Range Status   09/01/2024 4.32 3.77 - 5.28 10*6/mm3 Final   07/12/2024 5.0 3.8 - 5.1 x10(6)/uL Final   07/27/2018 4.60 3.80 - 5.10 x10(6)/mcL Final     Hemoglobin   Date Value Ref Range Status   09/01/2024 11.7 (L) 12.0 - 15.9 g/dL Final   07/12/2024 14.0 11.5 - 15.5 Gram/dL Final   07/27/2018 13.0 12.0 - 15.6 g/dL Final     Comment:     Reviewed by nader [id number]     Hematocrit   Date Value Ref Range Status   09/01/2024 36.0 34.0 - 46.6 % Final   07/12/2024 40.8 35.0 - 45.0 % Final   07/27/2018 38.1 35.7 - 45.9 % Final     MCV   Date Value Ref Range Status   09/01/2024 83.3 79.0 - 97.0 fL Final   07/12/2024 82.3 80.0 - 100.0 fL Final     MCH   Date Value Ref Range Status   09/01/2024 27.1 26.6 - 33.0 pg Final   07/12/2024 28.3 27.0 - 33.0 pg Final     MCHC   Date Value Ref Range Status   09/01/2024 32.5 31.5 - 35.7 g/dL Final    07/12/2024 34.4 32.0 - 36.0 Gram/dL Final   07/27/2018 34.2 32.1 - 35.3 g/dL Final     RDW   Date Value Ref Range Status   09/01/2024 13.1 12.3 - 15.4 % Final   07/12/2024 13.9 11.0 - 15.0 % Final     RDW-SD   Date Value Ref Range Status   09/01/2024 39.5 37.0 - 54.0 fl Final     MPV   Date Value Ref Range Status   09/01/2024 10.4 6.0 - 12.0 fL Final   07/12/2024 8.8 7.5 - 11.5 fL Final     Platelets   Date Value Ref Range Status   09/01/2024 252 140 - 450 10*3/mm3 Final   07/27/2018 221 144 - 423 x10(3)/mcL Final     External Platelets   Date Value Ref Range Status   07/12/2024 293 140 - 400 x10(3)/uL Final     Neutrophil Rel %   Date Value Ref Range Status   07/25/2018 90.2 % Final     Neutrophil %   Date Value Ref Range Status   09/01/2024 76.5 (H) 42.7 - 76.0 % Final     Lymphocyte %   Date Value Ref Range Status   09/01/2024 18.6 (L) 19.6 - 45.3 % Final   07/12/2024 29.6 % Final     Monocyte %   Date Value Ref Range Status   09/01/2024 4.1 (L) 5.0 - 12.0 % Final   07/12/2024 5.5 % Final     Eosinophil %   Date Value Ref Range Status   09/01/2024 0.1 (L) 0.3 - 6.2 % Final   07/12/2024 0.6 % Final   07/25/2018 0.1 % Final     Basophil Rel %   Date Value Ref Range Status   07/25/2018 0.2 % Final     Basophil %   Date Value Ref Range Status   09/01/2024 0.1 0.0 - 1.5 % Final   07/12/2024 0.3 % Final     Immature Grans %   Date Value Ref Range Status   09/01/2024 0.6 (H) 0.0 - 0.5 % Final     Neutrophils Absolute   Date Value Ref Range Status   07/25/2018 17.3 (H) 1.8 - 7.7 x10(3)/mcL Final     Neutrophils, Absolute   Date Value Ref Range Status   09/01/2024 11.54 (H) 1.70 - 7.00 10*3/mm3 Final   07/12/2024 5.8 1.5 - 7.8 x10(3)/uL Final     Lymphocytes Absolute   Date Value Ref Range Status   07/25/2018 5.5 % Final     Lymphocytes, Absolute   Date Value Ref Range Status   09/01/2024 2.81 0.70 - 3.10 10*3/mm3 Final   07/25/2018 1.1 0.6 - 4.8 x10(3)/mcL Final     Monocytes Absolute   Date Value Ref Range Status    07/25/2018 0.8 0.0 - 1.3 x10(3)/mcL Final     Monocytes, Absolute   Date Value Ref Range Status   09/01/2024 0.62 0.10 - 0.90 10*3/mm3 Final   07/12/2024 0.5 0.2 - 1.0 x10(3)/uL Final     Eosinophils, Absolute   Date Value Ref Range Status   09/01/2024 0.01 0.00 - 0.40 10*3/mm3 Final   07/12/2024 0.1 (L) 0.2 - 0.5 x10(3)/uL Final     Basophils Absolute   Date Value Ref Range Status   07/25/2018 0.0 0.0 - 0.2 x10(3)/mcL Final     External Basophil Abs   Date Value Ref Range Status   07/12/2024 0.0 0.0 - 0.2 x10(3)/uL Final     Basophils, Absolute   Date Value Ref Range Status   09/01/2024 0.02 0.00 - 0.20 10*3/mm3 Final     Immature Grans, Absolute   Date Value Ref Range Status   09/01/2024 0.09 (H) 0.00 - 0.05 10*3/mm3 Final     nRBC   Date Value Ref Range Status   09/01/2024 0.0 0.0 - 0.2 /100 WBC Final       Lab Results   Component Value Date    GLUCOSE 92 09/01/2024    BUN 9 09/01/2024    CREATININE 0.69 09/01/2024    EGFRIFNONA 87 06/01/2021    EGFRIFAFRI 154 07/27/2018    BCR 13.0 09/01/2024    CO2 23.4 09/01/2024    CALCIUM 7.6 (L) 09/01/2024    ALBUMIN 4.4 08/31/2024    AST 20 08/31/2024    ALT 19 08/31/2024         Imaging Results (Last 7 Days)       ** No results found for the last 168 hours. **              Assessment & Plan   Diagnoses and all orders for this visit:    1. Left lower quadrant abdominal pain (Primary)  Assessment & Plan:  - Improved but somewhat persistent (awakens her from her sleep)  - Suspect it is due to constipation as it has improved since starting Trulance   - Schedule colonoscopy to further evaluate. Sutab prep ordered   - Renew PRN Levsin -- instructed it is ok to take sparingly     Orders:  -     hyoscyamine (ANASPAZ,LEVSIN) 0.125 MG tablet; Take 1 tablet by mouth 4 (Four) Times a Day With Meals & at Bedtime.  Dispense: 120 tablet; Refill: 11  -     Case Request; Standing  -     Follow Anesthesia Guidelines / Protocol; Future  -     Verify bowel prep was successful; Standing  -      Give tap water enema if bowel prep was insufficient; Standing  -     Obtain Informed Consent; Standing  -     Case Request    2. Irritable bowel syndrome with constipation  Assessment & Plan:  - Controlled   - Continue Trulance 3 mg QD       Other orders  -     Sodium Sulfate-Mag Sulfate-KCl (Sutab) 6173-592-237 MG tablet; Take 1 tablet by mouth 1 (One) Time for 1 dose.  Dispense: 1 tablet; Refill: 0      RTC in 4 months     I have discussed the above plan with the patient.  They verbalize understanding and are in agreement with the plan.  They have been advised to contact the office for any questions, concerns, or changes related to their health.

## 2025-07-10 ENCOUNTER — LAB (OUTPATIENT)
Dept: LAB | Facility: HOSPITAL | Age: 35
End: 2025-07-10
Payer: COMMERCIAL

## 2025-07-10 ENCOUNTER — TRANSCRIBE ORDERS (OUTPATIENT)
Dept: ADMINISTRATIVE | Facility: HOSPITAL | Age: 35
End: 2025-07-10
Payer: COMMERCIAL

## 2025-07-10 ENCOUNTER — HOSPITAL ENCOUNTER (OUTPATIENT)
Dept: MRI IMAGING | Facility: HOSPITAL | Age: 35
Discharge: HOME | End: 2025-07-10
Payer: COMMERCIAL

## 2025-07-10 DIAGNOSIS — E78.5 HYPERLIPIDEMIA, UNSPECIFIED HYPERLIPIDEMIA TYPE: Primary | ICD-10-CM

## 2025-07-10 DIAGNOSIS — E78.5 HYPERLIPIDEMIA, UNSPECIFIED HYPERLIPIDEMIA TYPE: ICD-10-CM

## 2025-07-10 DIAGNOSIS — M17.12 PATELLOFEMORAL ARTHRITIS OF LEFT KNEE: ICD-10-CM

## 2025-07-10 LAB
ALBUMIN SERPL-MCNC: 4.2 G/DL (ref 3.5–5.2)
ALBUMIN/GLOB SERPL: 1.4 G/DL
ALP SERPL-CCNC: 103 U/L (ref 39–117)
ALT SERPL W P-5'-P-CCNC: 30 U/L (ref 1–33)
ANION GAP SERPL CALCULATED.3IONS-SCNC: 12 MMOL/L (ref 5–15)
AST SERPL-CCNC: 21 U/L (ref 1–32)
BILIRUB SERPL-MCNC: 0.2 MG/DL (ref 0–1.2)
BUN SERPL-MCNC: 11 MG/DL (ref 6–20)
BUN/CREAT SERPL: 13.9 (ref 7–25)
CALCIUM SPEC-SCNC: 9.4 MG/DL (ref 8.6–10.5)
CHLORIDE SERPL-SCNC: 102 MMOL/L (ref 98–107)
CHOLEST SERPL-MCNC: 175 MG/DL (ref 0–200)
CO2 SERPL-SCNC: 25 MMOL/L (ref 22–29)
CREAT SERPL-MCNC: 0.79 MG/DL (ref 0.57–1)
EGFRCR SERPLBLD CKD-EPI 2021: 99.6 ML/MIN/1.73
GLOBULIN UR ELPH-MCNC: 3.1 GM/DL
GLUCOSE SERPL-MCNC: 92 MG/DL (ref 65–99)
HDLC SERPL-MCNC: 59 MG/DL (ref 40–60)
LDLC SERPL CALC-MCNC: 102 MG/DL (ref 0–100)
LDLC/HDLC SERPL: 1.71 {RATIO}
POTASSIUM SERPL-SCNC: 3.6 MMOL/L (ref 3.5–5.2)
PROT SERPL-MCNC: 7.3 G/DL (ref 6–8.5)
SODIUM SERPL-SCNC: 139 MMOL/L (ref 136–145)
TRIGL SERPL-MCNC: 76 MG/DL (ref 0–150)
VLDLC SERPL-MCNC: 14 MG/DL (ref 5–40)

## 2025-07-10 PROCEDURE — 80061 LIPID PANEL: CPT

## 2025-07-10 PROCEDURE — 36415 COLL VENOUS BLD VENIPUNCTURE: CPT

## 2025-07-10 PROCEDURE — 80053 COMPREHEN METABOLIC PANEL: CPT

## 2025-07-10 PROCEDURE — 73721 MRI JNT OF LWR EXTRE W/O DYE: CPT

## 2025-07-16 ENCOUNTER — OFFICE VISIT (OUTPATIENT)
Dept: ORTHOPEDIC SURGERY | Facility: CLINIC | Age: 35
End: 2025-07-16
Payer: COMMERCIAL

## 2025-07-16 DIAGNOSIS — M17.12 PATELLOFEMORAL ARTHRITIS OF LEFT KNEE: Primary | ICD-10-CM

## 2025-07-16 PROCEDURE — 99213 OFFICE O/P EST LOW 20 MIN: CPT | Performed by: ORTHOPAEDIC SURGERY

## 2025-07-16 NOTE — PROGRESS NOTES
Subjective: Left knee pain     Patient ID: Denise Acosta is a 35 y.o. female.    Chief Complaint:    History of Present Illness 35-year-old female returns to review the results of the MRIs images and report personally reviewed.  Does not show significant surgical pathology.  She remains very symptomatic in the patellofemoral joint.  Again she has failed cortisone and anti-inflammatory medication.  Does wear a knee sleeve which helps but does not alleviate all the symptoms.  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       Social History     Occupational History    Not on file   Tobacco Use    Smoking status: Never    Smokeless tobacco: Never   Vaping Use    Vaping status: Never Used   Substance and Sexual Activity    Alcohol use: No    Drug use: No    Sexual activity: Yes     Partners: Male     Birth control/protection: Other, Vasectomy, Bilateral salpingectomy      Comment:       Review of Systems      Past Medical History:   Diagnosis Date    Anxiety     Asthma     Blood in urine     Cholelithiasis 5/2013    Had removed    Clostridium difficile diarrhea 2018    Elevated cholesterol     GERD (gastroesophageal reflux disease)     Gestational hypertension     History of Clostridioides difficile colitis     History of high cholesterol 2016    Hypertension     IBS (irritable bowel syndrome)     Kidney stones     Lactose intolerance 1/2023    Dairy free now    Migraines     PONV (postoperative nausea and vomiting)     Postural hypotension     Sepsis 04/2014    Stress fracture 2018    R foot    Tachycardia     PERIODS OF TACHYCARDIA WITH HOLTER MONITOR- MOST LIKELY RELATED TO KIDNEY STONES. APPOINTMENT WITH DR. SCOTT 9-16-24.    Urinary tract infection      Past Surgical History:   Procedure Laterality Date    CHOLECYSTECTOMY      D & C HYSTEROSCOPY ENDOMETRIAL ABLATION N/A 07/17/2023    Procedure: HYSTEROSCOPY NOVASURE ENDOMETRIAL  ABLATION;  Surgeon: Troy Castro MD;  Location:  [facility] OR;  Service: Obstetrics/Gynecology;  Laterality: N/A;    ENDOMETRIAL ABLATION  7/2023    LAPAROSCOPIC CHOLECYSTECTOMY  6/2014    SALPINGECTOMY Bilateral 07/17/2023    Procedure: SALPINGECTOMY LAPAROSCOPIC;  Surgeon: Troy Castro MD;  Location:  [facility] OR;  Service: Obstetrics/Gynecology;  Laterality: Bilateral;    TUBAL ABDOMINAL LIGATION  7/2023    Salpingectomy    URETEROSCOPY LASER LITHOTRIPSY WITH STENT INSERTION Left 08/30/2024    Procedure: LEFT URETEROSCOPY BASKET STONE EXTRACTION AND STENT PLACEMENT;  Surgeon: Gilberto Ramirez MD;  Location: [facility] MAIN OR;  Service: Urology;  Laterality: Left;    WISDOM TOOTH EXTRACTION       Family History   Problem Relation Age of Onset    Osteoporosis Mother     Colon polyps Mother     Anesthesia problems Mother         Nausea/vomiting    Hypertension Father     Colon polyps Father     Multiple myeloma Maternal Grandmother     Osteoporosis Maternal Grandmother     Heart disease Maternal Grandfather     Heart attack Maternal Grandfather     Diabetes Maternal Grandfather         Type II    Colon cancer Paternal Grandmother     Diabetes Paternal Grandfather         type I    Clotting disorder Sister     Hypertension Sister     Heart attack Maternal Uncle     Breast cancer Maternal Aunt     Malig Hyperthermia Neg Hx          Objective:  There were no vitals filed for this visit.  There were no vitals filed for this visit.  There is no height or weight on file to calculate BMI.           Ortho Exam   is alert and oriented x 3.  The knee is cool to touch.  She has 0 to 130 degrees of motion but there is patellofemoral crepitus and popping with range of motion.  There is no instability in flexion extension nor any varus or valgus instability.  Calf nontender.  Distal pulses.    Assessment:        1. Patellofemoral arthritis of left knee           Plan: Reviewed with the patient the results of  her MRI history and exam and treatment rendered today.  1 to get authorization for the gel injection to treat the patellofemoral changes that she has.  Continue the knee sleeve and the strengthening exercises.  The stronger she can make and correctly what I believed to be some patella maltracking I think she will get symptoms in conjunction with the gel injection.  Return to undergo that injection once approval has been obtained.            Work Status:    LIZABETH query complete.    Orders:  Orders Placed This Encounter   Procedures    Visco Treatment       Medications:  No orders of the defined types were placed in this encounter.      Followup:  Return in about 3 weeks (around 8/6/2025).          Dictated utilizing Dragon dictation

## 2025-07-30 ENCOUNTER — ANESTHESIA EVENT (OUTPATIENT)
Dept: PERIOP | Facility: HOSPITAL | Age: 35
End: 2025-07-30
Payer: COMMERCIAL

## 2025-07-31 RX ORDER — ONDANSETRON 4 MG/1
4 TABLET, FILM COATED ORAL EVERY 8 HOURS PRN
COMMUNITY

## 2025-08-01 ENCOUNTER — HOSPITAL ENCOUNTER (OUTPATIENT)
Facility: HOSPITAL | Age: 35
Setting detail: HOSPITAL OUTPATIENT SURGERY
Discharge: HOME | End: 2025-08-01
Attending: STUDENT IN AN ORGANIZED HEALTH CARE EDUCATION/TRAINING PROGRAM | Admitting: STUDENT IN AN ORGANIZED HEALTH CARE EDUCATION/TRAINING PROGRAM
Payer: COMMERCIAL

## 2025-08-01 ENCOUNTER — ANESTHESIA (OUTPATIENT)
Dept: PERIOP | Facility: HOSPITAL | Age: 35
End: 2025-08-01
Payer: COMMERCIAL

## 2025-08-01 VITALS
BODY MASS INDEX: 37.68 KG/M2 | SYSTOLIC BLOOD PRESSURE: 119 MMHG | WEIGHT: 199.4 LBS | TEMPERATURE: 98.5 F | HEART RATE: 65 BPM | DIASTOLIC BLOOD PRESSURE: 80 MMHG | RESPIRATION RATE: 15 BRPM | OXYGEN SATURATION: 99 %

## 2025-08-01 DIAGNOSIS — R10.32 LEFT LOWER QUADRANT ABDOMINAL PAIN: ICD-10-CM

## 2025-08-01 PROCEDURE — 25010000002 PROPOFOL 10 MG/ML EMULSION: Performed by: NURSE ANESTHETIST, CERTIFIED REGISTERED

## 2025-08-01 PROCEDURE — 45378 DIAGNOSTIC COLONOSCOPY: CPT | Performed by: STUDENT IN AN ORGANIZED HEALTH CARE EDUCATION/TRAINING PROGRAM

## 2025-08-01 PROCEDURE — 25810000003 LACTATED RINGERS PER 1000 ML: Performed by: ANESTHESIOLOGY

## 2025-08-01 PROCEDURE — 25010000002 LIDOCAINE 2% SOLUTION: Performed by: NURSE ANESTHETIST, CERTIFIED REGISTERED

## 2025-08-01 PROCEDURE — 25010000002 ONDANSETRON PER 1 MG: Performed by: NURSE ANESTHETIST, CERTIFIED REGISTERED

## 2025-08-01 RX ORDER — PROPOFOL 10 MG/ML
VIAL (ML) INTRAVENOUS AS NEEDED
Status: DISCONTINUED | OUTPATIENT
Start: 2025-08-01 | End: 2025-08-01 | Stop reason: SURG

## 2025-08-01 RX ORDER — SODIUM CHLORIDE 0.9 % (FLUSH) 0.9 %
10 SYRINGE (ML) INJECTION AS NEEDED
Status: DISCONTINUED | OUTPATIENT
Start: 2025-08-01 | End: 2025-08-04 | Stop reason: HOSPADM

## 2025-08-01 RX ORDER — SODIUM CHLORIDE 9 MG/ML
40 INJECTION, SOLUTION INTRAVENOUS AS NEEDED
Status: DISCONTINUED | OUTPATIENT
Start: 2025-08-01 | End: 2025-08-04 | Stop reason: HOSPADM

## 2025-08-01 RX ORDER — SODIUM CHLORIDE, SODIUM LACTATE, POTASSIUM CHLORIDE, CALCIUM CHLORIDE 600; 310; 30; 20 MG/100ML; MG/100ML; MG/100ML; MG/100ML
100 INJECTION, SOLUTION INTRAVENOUS ONCE
Status: DISCONTINUED | OUTPATIENT
Start: 2025-08-01 | End: 2025-08-04 | Stop reason: HOSPADM

## 2025-08-01 RX ORDER — LIDOCAINE HYDROCHLORIDE 10 MG/ML
0.5 INJECTION, SOLUTION EPIDURAL; INFILTRATION; INTRACAUDAL; PERINEURAL ONCE AS NEEDED
Status: DISCONTINUED | OUTPATIENT
Start: 2025-08-01 | End: 2025-08-04 | Stop reason: HOSPADM

## 2025-08-01 RX ORDER — SODIUM CHLORIDE 0.9 % (FLUSH) 0.9 %
10 SYRINGE (ML) INJECTION EVERY 12 HOURS SCHEDULED
Status: DISCONTINUED | OUTPATIENT
Start: 2025-08-01 | End: 2025-08-04 | Stop reason: HOSPADM

## 2025-08-01 RX ORDER — LIDOCAINE HYDROCHLORIDE 20 MG/ML
INJECTION, SOLUTION INFILTRATION; PERINEURAL AS NEEDED
Status: DISCONTINUED | OUTPATIENT
Start: 2025-08-01 | End: 2025-08-01 | Stop reason: SURG

## 2025-08-01 RX ORDER — ONDANSETRON 2 MG/ML
4 INJECTION INTRAMUSCULAR; INTRAVENOUS ONCE AS NEEDED
Status: COMPLETED | OUTPATIENT
Start: 2025-08-01 | End: 2025-08-01

## 2025-08-01 RX ORDER — SODIUM CHLORIDE, SODIUM LACTATE, POTASSIUM CHLORIDE, CALCIUM CHLORIDE 600; 310; 30; 20 MG/100ML; MG/100ML; MG/100ML; MG/100ML
9 INJECTION, SOLUTION INTRAVENOUS CONTINUOUS
Status: DISCONTINUED | OUTPATIENT
Start: 2025-08-01 | End: 2025-08-02 | Stop reason: HOSPADM

## 2025-08-01 RX ADMIN — PROPOFOL 40 MG: 10 INJECTION, EMULSION INTRAVENOUS at 12:50

## 2025-08-01 RX ADMIN — PROPOFOL 80 MG: 10 INJECTION, EMULSION INTRAVENOUS at 12:49

## 2025-08-01 RX ADMIN — PROPOFOL 20 MG: 10 INJECTION, EMULSION INTRAVENOUS at 12:58

## 2025-08-01 RX ADMIN — PROPOFOL 20 MG: 10 INJECTION, EMULSION INTRAVENOUS at 13:00

## 2025-08-01 RX ADMIN — PROPOFOL 40 MG: 10 INJECTION, EMULSION INTRAVENOUS at 12:56

## 2025-08-01 RX ADMIN — PROPOFOL 40 MG: 10 INJECTION, EMULSION INTRAVENOUS at 12:52

## 2025-08-01 RX ADMIN — ONDANSETRON 4 MG: 2 INJECTION, SOLUTION INTRAMUSCULAR; INTRAVENOUS at 13:16

## 2025-08-01 RX ADMIN — SODIUM CHLORIDE, POTASSIUM CHLORIDE, SODIUM LACTATE AND CALCIUM CHLORIDE 9 ML/HR: 600; 310; 30; 20 INJECTION, SOLUTION INTRAVENOUS at 09:31

## 2025-08-01 RX ADMIN — LIDOCAINE HYDROCHLORIDE 50 MG: 20 INJECTION, SOLUTION INFILTRATION; PERINEURAL at 12:49

## 2025-08-01 RX ADMIN — PROPOFOL 40 MG: 10 INJECTION, EMULSION INTRAVENOUS at 12:54

## 2025-08-01 NOTE — ANESTHESIA PREPROCEDURE EVALUATION
Anesthesia Evaluation     Patient summary reviewed and Nursing notes reviewed   history of anesthetic complications:  PONV  NPO Solid Status: > 8 hours  NPO Liquid Status: > 6 hours           Airway   Mallampati: II  TM distance: >3 FB  Neck ROM: full  No difficulty expected  Dental - normal exam     Pulmonary - normal exam Asthma: resolved. Sleep apnea: occ.  Cardiovascular - normal exam  Exercise tolerance: good (4-7 METS)    Rhythm: regular  Rate: normal    (+) dysrhythmias (propanolol), hyperlipidemia      Neuro/Psych  (+) headaches (migraines), psychiatric history Anxiety  GI/Hepatic/Renal/Endo    (+) obesity, GERD well controlled, renal disease- stones    Musculoskeletal     Abdominal   (+) obese   Substance History - negative use     OB/GYN negative ob/gyn ROS         Other   arthritis (left patellofemoral isssues),                   Anesthesia Plan    ASA 2     MAC     intravenous induction     Anesthetic plan, risks, benefits, and alternatives have been provided, discussed and informed consent has been obtained with: patient and spouse/significant other.  Pre-procedure education provided  Use of blood products discussed with patient and spouse/significant other .    Plan discussed with CRNA.    CODE STATUS:

## 2025-08-01 NOTE — ANESTHESIA POSTPROCEDURE EVALUATION
Patient: Denise Acosta    Procedure Summary       Date: 08/01/25 Room / Location: [facility] ENDOSCOPY 2 /  [facility] OR    Anesthesia Start: 1244 Anesthesia Stop: 1306    Procedure: COLONOSCOPY Diagnosis:       Left lower quadrant abdominal pain      (Left lower quadrant abdominal pain [R10.32])    Surgeons: Jose Ramon Raymundo MD Provider: Janie Denise CRNA    Anesthesia Type: MAC ASA Status: 2            Anesthesia Type: MAC    Vitals  Vitals Value Taken Time   /80 08/01/25 13:35   Temp     Pulse 70 08/01/25 13:36   Resp 15 08/01/25 13:35   SpO2 99 % 08/01/25 13:36   Vitals shown include unfiled device data.        Post Anesthesia Care and Evaluation    Patient location during evaluation: PHASE II  Patient participation: complete - patient participated  Level of consciousness: awake and alert  Pain score: 0  Pain management: satisfactory to patient    Airway patency: patent  Anesthetic complications: No anesthetic complications  PONV Status: controlled  Cardiovascular status: acceptable  Respiratory status: acceptable  Hydration status: acceptable

## 2025-08-27 ENCOUNTER — CLINICAL SUPPORT (OUTPATIENT)
Dept: ORTHOPEDIC SURGERY | Facility: CLINIC | Age: 35
End: 2025-08-27
Payer: COMMERCIAL

## 2025-08-27 VITALS — HEIGHT: 61 IN | WEIGHT: 199 LBS | BODY MASS INDEX: 37.57 KG/M2

## 2025-08-27 DIAGNOSIS — M17.12 PATELLOFEMORAL ARTHRITIS OF LEFT KNEE: Primary | ICD-10-CM

## 2025-08-27 RX ORDER — TEMAZEPAM 7.5 MG/1
7.5 CAPSULE ORAL NIGHTLY PRN
COMMUNITY
Start: 2025-08-01 | End: 2025-08-31

## 2025-08-27 RX ORDER — CARIPRAZINE 1.5 MG/1
1 CAPSULE, GELATIN COATED ORAL DAILY
COMMUNITY
Start: 2025-08-18

## 2025-08-27 RX ORDER — HYALURONATE SODIUM, STABILIZED 60 MG/3 ML
60 SYRINGE (ML) INTRAARTICULAR ONCE
COMMUNITY
Start: 2025-08-12

## 2025-08-27 RX ORDER — HYDROXYZINE PAMOATE 50 MG/1
1 CAPSULE ORAL DAILY
COMMUNITY
Start: 2025-08-18

## (undated) DEVICE — TIDISHIELD UROLOGY DRAIN BAGS FROSTY VINYL STERILE FITS SIEMENS UROSKOP ACCESS 20 PER CASE: Brand: TIDISHIELD

## (undated) DEVICE — NITINOL STONE RETRIEVAL BASKET: Brand: ZERO TIP

## (undated) DEVICE — BW-412T DISP COMBO CLEANING BRUSH: Brand: SINGLE USE COMBINATION CLEANING BRUSH

## (undated) DEVICE — SOL IRR H2O BO 1000ML STRL

## (undated) DEVICE — KT ORCA ORCAPOD DISP STRL

## (undated) DEVICE — URETERAL DILATATION SYSTEM

## (undated) DEVICE — GLV SURG SIGNATURE ESSENTIAL PF LTX SZ8

## (undated) DEVICE — Device

## (undated) DEVICE — LINER SURG CANSTR SXN S/RIGD 1500CC

## (undated) DEVICE — SYR LL W/SCALE/MARK 3ML STRL

## (undated) DEVICE — PK URETSCP 40

## (undated) DEVICE — NITINOL WIRE WITH HYDROPHILIC TIP: Brand: SENSOR

## (undated) DEVICE — ADAPT CLN BIOGUARD AIR/H2O DISP